# Patient Record
Sex: MALE | Race: BLACK OR AFRICAN AMERICAN | Employment: FULL TIME | URBAN - METROPOLITAN AREA
[De-identification: names, ages, dates, MRNs, and addresses within clinical notes are randomized per-mention and may not be internally consistent; named-entity substitution may affect disease eponyms.]

---

## 2017-12-01 ENCOUNTER — TELEPHONE (OUTPATIENT)
Dept: INTERNAL MEDICINE CLINIC | Age: 32
End: 2017-12-01

## 2017-12-05 ENCOUNTER — OFFICE VISIT (OUTPATIENT)
Dept: INTERNAL MEDICINE CLINIC | Age: 32
End: 2017-12-05

## 2017-12-05 VITALS
OXYGEN SATURATION: 99 % | BODY MASS INDEX: 45.1 KG/M2 | HEIGHT: 70 IN | WEIGHT: 315 LBS | SYSTOLIC BLOOD PRESSURE: 138 MMHG | DIASTOLIC BLOOD PRESSURE: 86 MMHG | HEART RATE: 90 BPM | TEMPERATURE: 98.4 F

## 2017-12-05 DIAGNOSIS — Z11.4 ENCOUNTER FOR SCREENING FOR HIV: ICD-10-CM

## 2017-12-05 DIAGNOSIS — Z00.00 ROUTINE GENERAL MEDICAL EXAMINATION AT A HEALTH CARE FACILITY: Primary | ICD-10-CM

## 2017-12-05 LAB
ANION GAP SERPL CALCULATED.3IONS-SCNC: 19 MMOL/L (ref 3–16)
BASOPHILS ABSOLUTE: 0.1 K/UL (ref 0–0.2)
BASOPHILS RELATIVE PERCENT: 1 %
BUN BLDV-MCNC: 11 MG/DL (ref 7–20)
CALCIUM SERPL-MCNC: 9.5 MG/DL (ref 8.3–10.6)
CHLORIDE BLD-SCNC: 99 MMOL/L (ref 99–110)
CHOLESTEROL, TOTAL: 225 MG/DL (ref 0–199)
CO2: 28 MMOL/L (ref 21–32)
CREAT SERPL-MCNC: 0.9 MG/DL (ref 0.9–1.3)
EOSINOPHILS ABSOLUTE: 0.2 K/UL (ref 0–0.6)
EOSINOPHILS RELATIVE PERCENT: 3.2 %
GFR AFRICAN AMERICAN: >60
GFR NON-AFRICAN AMERICAN: >60
GLUCOSE BLD-MCNC: 85 MG/DL (ref 70–99)
HCT VFR BLD CALC: 45.1 % (ref 40.5–52.5)
HDLC SERPL-MCNC: 45 MG/DL (ref 40–60)
HEMOGLOBIN: 14.9 G/DL (ref 13.5–17.5)
LDL CHOLESTEROL CALCULATED: 147 MG/DL
LYMPHOCYTES ABSOLUTE: 1.9 K/UL (ref 1–5.1)
LYMPHOCYTES RELATIVE PERCENT: 25.3 %
MCH RBC QN AUTO: 28.1 PG (ref 26–34)
MCHC RBC AUTO-ENTMCNC: 33 G/DL (ref 31–36)
MCV RBC AUTO: 85.2 FL (ref 80–100)
MONOCYTES ABSOLUTE: 0.7 K/UL (ref 0–1.3)
MONOCYTES RELATIVE PERCENT: 9.5 %
NEUTROPHILS ABSOLUTE: 4.7 K/UL (ref 1.7–7.7)
NEUTROPHILS RELATIVE PERCENT: 61 %
PDW BLD-RTO: 14.3 % (ref 12.4–15.4)
PLATELET # BLD: 263 K/UL (ref 135–450)
PMV BLD AUTO: 9.9 FL (ref 5–10.5)
POTASSIUM SERPL-SCNC: 4 MMOL/L (ref 3.5–5.1)
RBC # BLD: 5.29 M/UL (ref 4.2–5.9)
SODIUM BLD-SCNC: 146 MMOL/L (ref 136–145)
TRIGL SERPL-MCNC: 163 MG/DL (ref 0–150)
TSH REFLEX: 2.75 UIU/ML (ref 0.27–4.2)
VLDLC SERPL CALC-MCNC: 33 MG/DL
WBC # BLD: 7.7 K/UL (ref 4–11)

## 2017-12-05 PROCEDURE — 99395 PREV VISIT EST AGE 18-39: CPT | Performed by: NURSE PRACTITIONER

## 2017-12-05 ASSESSMENT — ENCOUNTER SYMPTOMS: COUGH: 1

## 2017-12-06 LAB — HIV-1 AND HIV-2 ANTIBODIES: NORMAL

## 2019-01-04 ENCOUNTER — HOSPITAL ENCOUNTER (EMERGENCY)
Age: 34
Discharge: HOME | End: 2019-01-04
Attending: EMERGENCY MEDICINE
Payer: COMMERCIAL

## 2019-01-04 ENCOUNTER — APPOINTMENT (OUTPATIENT)
Dept: CT IMAGING | Age: 34
End: 2019-01-04
Payer: COMMERCIAL

## 2019-01-04 VITALS
TEMPERATURE: 101.7 F | WEIGHT: 315 LBS | HEART RATE: 114 BPM | BODY MASS INDEX: 57.83 KG/M2 | OXYGEN SATURATION: 92 % | RESPIRATION RATE: 27 BRPM | DIASTOLIC BLOOD PRESSURE: 113 MMHG | SYSTOLIC BLOOD PRESSURE: 177 MMHG

## 2019-01-04 DIAGNOSIS — R11.2 NAUSEA VOMITING AND DIARRHEA: Primary | ICD-10-CM

## 2019-01-04 DIAGNOSIS — R19.7 NAUSEA VOMITING AND DIARRHEA: Primary | ICD-10-CM

## 2019-01-04 LAB
A/G RATIO: 1.1 (ref 1.1–2.2)
ALBUMIN SERPL-MCNC: 4.2 G/DL (ref 3.4–5)
ALP BLD-CCNC: 78 U/L (ref 40–129)
ALT SERPL-CCNC: 22 U/L (ref 10–40)
ANION GAP SERPL CALCULATED.3IONS-SCNC: 13 MMOL/L (ref 3–16)
AST SERPL-CCNC: 19 U/L (ref 15–37)
BASOPHILS ABSOLUTE: 0 K/UL (ref 0–0.2)
BASOPHILS RELATIVE PERCENT: 0.3 %
BILIRUB SERPL-MCNC: 0.5 MG/DL (ref 0–1)
BUN BLDV-MCNC: 11 MG/DL (ref 7–20)
CALCIUM SERPL-MCNC: 9.3 MG/DL (ref 8.3–10.6)
CHLORIDE BLD-SCNC: 100 MMOL/L (ref 99–110)
CO2: 28 MMOL/L (ref 21–32)
CREAT SERPL-MCNC: 0.9 MG/DL (ref 0.9–1.3)
EOSINOPHILS ABSOLUTE: 0.1 K/UL (ref 0–0.6)
EOSINOPHILS RELATIVE PERCENT: 1.1 %
GFR AFRICAN AMERICAN: >60
GFR NON-AFRICAN AMERICAN: >60
GLOBULIN: 3.7 G/DL
GLUCOSE BLD-MCNC: 110 MG/DL (ref 70–99)
HCT VFR BLD CALC: 45.6 % (ref 40.5–52.5)
HEMOGLOBIN: 14.9 G/DL (ref 13.5–17.5)
LYMPHOCYTES ABSOLUTE: 0.4 K/UL (ref 1–5.1)
LYMPHOCYTES RELATIVE PERCENT: 4.2 %
MCH RBC QN AUTO: 27.1 PG (ref 26–34)
MCHC RBC AUTO-ENTMCNC: 32.8 G/DL (ref 31–36)
MCV RBC AUTO: 82.7 FL (ref 80–100)
MONOCYTES ABSOLUTE: 0.4 K/UL (ref 0–1.3)
MONOCYTES RELATIVE PERCENT: 3.5 %
NEUTROPHILS ABSOLUTE: 9.6 K/UL (ref 1.7–7.7)
NEUTROPHILS RELATIVE PERCENT: 90.9 %
PDW BLD-RTO: 14.7 % (ref 12.4–15.4)
PLATELET # BLD: 296 K/UL (ref 135–450)
PMV BLD AUTO: 9 FL (ref 5–10.5)
POTASSIUM SERPL-SCNC: 3.8 MMOL/L (ref 3.5–5.1)
RBC # BLD: 5.51 M/UL (ref 4.2–5.9)
SODIUM BLD-SCNC: 141 MMOL/L (ref 136–145)
TOTAL PROTEIN: 7.9 G/DL (ref 6.4–8.2)
WBC # BLD: 10.6 K/UL (ref 4–11)

## 2019-01-04 PROCEDURE — 85025 COMPLETE CBC W/AUTO DIFF WBC: CPT

## 2019-01-04 PROCEDURE — 99284 EMERGENCY DEPT VISIT MOD MDM: CPT

## 2019-01-04 PROCEDURE — 2580000003 HC RX 258: Performed by: EMERGENCY MEDICINE

## 2019-01-04 PROCEDURE — 80053 COMPREHEN METABOLIC PANEL: CPT

## 2019-01-04 PROCEDURE — 6360000004 HC RX CONTRAST MEDICATION: Performed by: EMERGENCY MEDICINE

## 2019-01-04 PROCEDURE — 6360000002 HC RX W HCPCS: Performed by: EMERGENCY MEDICINE

## 2019-01-04 PROCEDURE — 74177 CT ABD & PELVIS W/CONTRAST: CPT

## 2019-01-04 PROCEDURE — 6370000000 HC RX 637 (ALT 250 FOR IP): Performed by: EMERGENCY MEDICINE

## 2019-01-04 PROCEDURE — 96374 THER/PROPH/DIAG INJ IV PUSH: CPT

## 2019-01-04 PROCEDURE — 96361 HYDRATE IV INFUSION ADD-ON: CPT

## 2019-01-04 RX ORDER — ONDANSETRON 8 MG/1
8 TABLET, ORALLY DISINTEGRATING ORAL EVERY 8 HOURS PRN
Qty: 30 TABLET | Refills: 2 | Status: SHIPPED | OUTPATIENT
Start: 2019-01-04 | End: 2019-01-04

## 2019-01-04 RX ORDER — ONDANSETRON 2 MG/ML
4 INJECTION INTRAMUSCULAR; INTRAVENOUS
Status: COMPLETED | OUTPATIENT
Start: 2019-01-04 | End: 2019-01-04

## 2019-01-04 RX ORDER — DICYCLOMINE HYDROCHLORIDE 10 MG/ML
20 INJECTION INTRAMUSCULAR ONCE
Status: DISCONTINUED | OUTPATIENT
Start: 2019-01-04 | End: 2019-01-04

## 2019-01-04 RX ORDER — ONDANSETRON 8 MG/1
8 TABLET, ORALLY DISINTEGRATING ORAL EVERY 8 HOURS PRN
Qty: 30 TABLET | Refills: 2 | Status: SHIPPED | OUTPATIENT
Start: 2019-01-04 | End: 2019-05-04

## 2019-01-04 RX ORDER — DICYCLOMINE HCL 20 MG
20 TABLET ORAL ONCE
Status: COMPLETED | OUTPATIENT
Start: 2019-01-04 | End: 2019-01-04

## 2019-01-04 RX ORDER — 0.9 % SODIUM CHLORIDE 0.9 %
1000 INTRAVENOUS SOLUTION INTRAVENOUS ONCE
Status: COMPLETED | OUTPATIENT
Start: 2019-01-04 | End: 2019-01-04

## 2019-01-04 RX ORDER — LABETALOL 100 MG/1
50 TABLET, FILM COATED ORAL ONCE
Status: COMPLETED | OUTPATIENT
Start: 2019-01-04 | End: 2019-01-04

## 2019-01-04 RX ORDER — TRIAMTERENE AND HYDROCHLOROTHIAZIDE 37.5; 25 MG/1; MG/1
1 TABLET ORAL ONCE
Status: COMPLETED | OUTPATIENT
Start: 2019-01-04 | End: 2019-01-04

## 2019-01-04 RX ORDER — LOPERAMIDE HYDROCHLORIDE 2 MG/1
2 CAPSULE ORAL ONCE
Status: COMPLETED | OUTPATIENT
Start: 2019-01-04 | End: 2019-01-04

## 2019-01-04 RX ORDER — SODIUM CHLORIDE, SODIUM LACTATE, POTASSIUM CHLORIDE, AND CALCIUM CHLORIDE .6; .31; .03; .02 G/100ML; G/100ML; G/100ML; G/100ML
1000 INJECTION, SOLUTION INTRAVENOUS ONCE
Status: COMPLETED | OUTPATIENT
Start: 2019-01-04 | End: 2019-01-04

## 2019-01-04 RX ADMIN — DICYCLOMINE HYDROCHLORIDE 20 MG: 20 TABLET ORAL at 17:58

## 2019-01-04 RX ADMIN — ONDANSETRON 4 MG: 2 INJECTION INTRAMUSCULAR; INTRAVENOUS at 16:59

## 2019-01-04 RX ADMIN — TRIAMTERENE AND HYDROCHLOROTHIAZIDE 1 TABLET: 37.5; 25 TABLET ORAL at 19:15

## 2019-01-04 RX ADMIN — SODIUM CHLORIDE, POTASSIUM CHLORIDE, SODIUM LACTATE AND CALCIUM CHLORIDE 1000 ML: 600; 310; 30; 20 INJECTION, SOLUTION INTRAVENOUS at 20:52

## 2019-01-04 RX ADMIN — LABETALOL HYDROCHLORIDE 50 MG: 100 TABLET, FILM COATED ORAL at 19:15

## 2019-01-04 RX ADMIN — LOPERAMIDE HYDROCHLORIDE 2 MG: 2 CAPSULE ORAL at 16:59

## 2019-01-04 RX ADMIN — IOPAMIDOL 80 ML: 755 INJECTION, SOLUTION INTRAVENOUS at 20:18

## 2019-01-04 RX ADMIN — SODIUM CHLORIDE 1000 ML: 9 INJECTION, SOLUTION INTRAVENOUS at 16:59

## 2019-01-04 ASSESSMENT — ENCOUNTER SYMPTOMS
SHORTNESS OF BREATH: 0
DIARRHEA: 1
NAUSEA: 1
VOMITING: 1
ABDOMINAL PAIN: 1
BACK PAIN: 0
COUGH: 0
RHINORRHEA: 0

## 2019-01-04 ASSESSMENT — PAIN DESCRIPTION - PAIN TYPE: TYPE: ACUTE PAIN

## 2019-01-04 ASSESSMENT — PAIN SCALES - GENERAL: PAINLEVEL_OUTOF10: 7

## 2019-01-23 ENCOUNTER — OFFICE VISIT (OUTPATIENT)
Dept: PRIMARY CARE CLINIC | Age: 34
End: 2019-01-23
Payer: COMMERCIAL

## 2019-01-23 VITALS
DIASTOLIC BLOOD PRESSURE: 88 MMHG | SYSTOLIC BLOOD PRESSURE: 138 MMHG | BODY MASS INDEX: 56.42 KG/M2 | TEMPERATURE: 98.5 F | HEART RATE: 68 BPM | WEIGHT: 315 LBS

## 2019-01-23 DIAGNOSIS — R73.09 ELEVATED GLUCOSE: ICD-10-CM

## 2019-01-23 DIAGNOSIS — R19.7 DIARRHEA, UNSPECIFIED TYPE: Primary | ICD-10-CM

## 2019-01-23 PROCEDURE — 99214 OFFICE O/P EST MOD 30 MIN: CPT | Performed by: INTERNAL MEDICINE

## 2019-01-24 LAB
ESTIMATED AVERAGE GLUCOSE: 131.2 MG/DL
HBA1C MFR BLD: 6.2 %

## 2019-05-04 ENCOUNTER — HOSPITAL ENCOUNTER (EMERGENCY)
Age: 34
Discharge: HOME | End: 2019-05-04
Attending: EMERGENCY MEDICINE
Payer: COMMERCIAL

## 2019-05-04 VITALS
OXYGEN SATURATION: 98 % | HEART RATE: 96 BPM | TEMPERATURE: 98.8 F | DIASTOLIC BLOOD PRESSURE: 126 MMHG | RESPIRATION RATE: 17 BRPM | SYSTOLIC BLOOD PRESSURE: 205 MMHG

## 2019-05-04 DIAGNOSIS — S61.213A LACERATION OF LEFT MIDDLE FINGER WITHOUT FOREIGN BODY WITHOUT DAMAGE TO NAIL, INITIAL ENCOUNTER: Primary | ICD-10-CM

## 2019-05-04 DIAGNOSIS — R03.0 ELEVATED BLOOD PRESSURE READING: ICD-10-CM

## 2019-05-04 PROCEDURE — 4500000022 HC ED LEVEL 2 PROCEDURE

## 2019-05-04 PROCEDURE — 2500000003 HC RX 250 WO HCPCS: Performed by: EMERGENCY MEDICINE

## 2019-05-04 PROCEDURE — 90715 TDAP VACCINE 7 YRS/> IM: CPT | Performed by: EMERGENCY MEDICINE

## 2019-05-04 PROCEDURE — 90471 IMMUNIZATION ADMIN: CPT | Performed by: EMERGENCY MEDICINE

## 2019-05-04 PROCEDURE — 6370000000 HC RX 637 (ALT 250 FOR IP): Performed by: EMERGENCY MEDICINE

## 2019-05-04 PROCEDURE — 6360000002 HC RX W HCPCS: Performed by: EMERGENCY MEDICINE

## 2019-05-04 PROCEDURE — 99282 EMERGENCY DEPT VISIT SF MDM: CPT

## 2019-05-04 RX ORDER — BACITRACIN ZINC AND POLYMYXIN B SULFATE 500; 1000 [USP'U]/G; [USP'U]/G
OINTMENT TOPICAL ONCE
Status: COMPLETED | OUTPATIENT
Start: 2019-05-04 | End: 2019-05-04

## 2019-05-04 RX ORDER — AMLODIPINE BESYLATE 5 MG/1
5 TABLET ORAL DAILY
Status: DISCONTINUED | OUTPATIENT
Start: 2019-05-04 | End: 2019-05-04 | Stop reason: HOSPADM

## 2019-05-04 RX ORDER — LIDOCAINE HYDROCHLORIDE 10 MG/ML
5 INJECTION, SOLUTION EPIDURAL; INFILTRATION; INTRACAUDAL; PERINEURAL ONCE
Status: COMPLETED | OUTPATIENT
Start: 2019-05-04 | End: 2019-05-04

## 2019-05-04 RX ORDER — AMLODIPINE BESYLATE 5 MG/1
5 TABLET ORAL DAILY
Qty: 30 TABLET | Refills: 2 | Status: SHIPPED | OUTPATIENT
Start: 2019-05-04 | End: 2021-10-17 | Stop reason: SDUPTHER

## 2019-05-04 RX ADMIN — TETANUS TOXOID, REDUCED DIPHTHERIA TOXOID AND ACELLULAR PERTUSSIS VACCINE, ADSORBED 0.5 ML: 5; 2.5; 8; 8; 2.5 SUSPENSION INTRAMUSCULAR at 18:45

## 2019-05-04 RX ADMIN — AMLODIPINE BESYLATE 5 MG: 5 TABLET ORAL at 18:56

## 2019-05-04 RX ADMIN — BACITRACIN ZINC AND POLYMYXIN B SULFATE: 500; 10000 OINTMENT TOPICAL at 19:38

## 2019-05-04 RX ADMIN — LIDOCAINE HYDROCHLORIDE 5 ML: 10 INJECTION, SOLUTION EPIDURAL; INFILTRATION; INTRACAUDAL; PERINEURAL at 18:26

## 2019-05-04 NOTE — ED PROVIDER NOTES
It probably a runningTRIAGE CHIEF COMPLAINT:   Chief Complaint   Patient presents with    Laceration         HPI: Osman Cruz is a 35 y.o. male who presents to the Emergency Department with complaint of laceration of his left third fingertip at work while he was cutting food with a knife. 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 He is right-handed. Tetanus status is uncertain. Complains of local pain. The patient has a history of hypertension and states for some time now he has been only on Maxide 37.5-25 daily. He was previously on labetalol. REVIEW OF SYSTEMS:  6 systems reviewed. Pertinent positives per HPI. Otherwise noted to be negative. Nursing notes reviewed and agree with above. Past medical/surgical history reviewed. MEDICATIONS   Patient's Medications   New Prescriptions    No medications on file   Previous Medications    TRIAMTERENE-HYDROCHLOROTHIAZIDE (MAXZIDE-25) 37.5-25 MG PER TABLET    Take 1 tablet by mouth daily   Modified Medications    No medications on file   Discontinued Medications    LABETALOL (NORMODYNE) 100 MG TABLET    Take 0.5 tablets by mouth every 12 hours    ONDANSETRON (ZOFRAN-ODT) 8 MG TBDP DISINTEGRATING TABLET    Take 1 tablet by mouth every 8 hours as needed for Nausea or Vomiting         ALLERGIES No Known Allergies      BP (!) 199/108   Pulse 104   Temp 98.8 °F (37.1 °C)   Resp 17   SpO2 98%   General:  No acute distress. Non toxic appearance  Head:   Normocephalic and atraumatic  Eyes:   Conjunctiva clear, MARCO, EOM's intact. Sclera anicteric. ENT:   Mucous membranes moist  Neck:   Supple. No adenopathy or jugular venous distension  Lungs/Chest:  No respiratory distress  CVS:   Regular rate and rhythm  Abdomen:  ddeferred  Extremities:  Full range of motion. There is a  Transverse 1.5 cm laceration on the left third fingertip ddistally. No nail or nailbed involvement. This is oozing. No evidence of infection.   Skin:   No rashes or lesions to exposed skin  Back:   deferred  Neuro:  Alert and OX3. Speech clear and appropriate. No focal weakness. Normal sensation in all extremities. Gait normal.  Psych:   Affect normal. Mood normal        RADIOLOGY:      LAB    PROCEDURES:  Options were discussed and the patient requested sutures. The area was anesthetized with 1% plain lidocaine using 2cc. It was cleaned with Hibiclens and saline. Exploration was negative. It was repaired using 5-0 nylon sutures ×5. Cover with Polysporin and a Band-Aid. ED COURSE / MDM:  43-year-old male with superficial laceration of left third fingertip had repair done with simple nylon sutures after local anesthesia with 1% plain lidocaine. He was given tetanus immunization. The patient's blood pressure was markedly elevated here however the blood pressure cuff is too small for his arm. I discussed adding something to his regimen and he was agreeable. He was given Norvasc by mouth here. His elevated blood pressure is asymptomatic. No chest pain, shortness of breath, headache, neck pain, dizziness, vertigo, numbness or weakness. I recommended follow-up with his primary care doctor. He was given a prescription for Norvasc and advised to take along with the diuretic. I recommended follow up with the worker's comp physician regarding his finger laceration. I discussed with Elzbieta Lei the results of evaluation in the Emergency Department, diagnosis, care and prognosis. The plan is to discharge to home. The patient is in agreement with the plan and questions have been answered. I also discussed with the patient and/or family the reasons which may require a return visit and the importance of follow-up care.        (Please note that portions of this note may have been completed with a voice recognition program.  Efforts were made to edit the dictation but occasionally words are mis-transcribed)        FINAL IMPRESSION:  1 -- laceration left middle finger, 1.5 cm, simple repair  2 -- elevated blood pressure reading                    Prince Philip MD  05/04/19 7803

## 2021-10-17 ENCOUNTER — HOSPITAL ENCOUNTER (EMERGENCY)
Age: 36
Discharge: HOME | End: 2021-10-17
Attending: EMERGENCY MEDICINE
Payer: COMMERCIAL

## 2021-10-17 ENCOUNTER — APPOINTMENT (OUTPATIENT)
Dept: GENERAL RADIOLOGY | Age: 36
End: 2021-10-17
Payer: COMMERCIAL

## 2021-10-17 VITALS
BODY MASS INDEX: 46.65 KG/M2 | SYSTOLIC BLOOD PRESSURE: 178 MMHG | WEIGHT: 315 LBS | DIASTOLIC BLOOD PRESSURE: 113 MMHG | HEART RATE: 115 BPM | HEIGHT: 69 IN | OXYGEN SATURATION: 96 % | TEMPERATURE: 98.2 F | RESPIRATION RATE: 31 BRPM

## 2021-10-17 DIAGNOSIS — I10 UNCONTROLLED HYPERTENSION: Primary | ICD-10-CM

## 2021-10-17 DIAGNOSIS — R00.0 TACHYCARDIA: ICD-10-CM

## 2021-10-17 LAB
A/G RATIO: 0.8 (ref 1.1–2.2)
ALBUMIN SERPL-MCNC: 3.6 G/DL (ref 3.4–5)
ALP BLD-CCNC: 57 U/L (ref 40–129)
ALT SERPL-CCNC: 23 U/L (ref 10–40)
ANION GAP SERPL CALCULATED.3IONS-SCNC: 10 MMOL/L (ref 3–16)
AST SERPL-CCNC: 20 U/L (ref 15–37)
BASOPHILS ABSOLUTE: 0.1 K/UL (ref 0–0.2)
BASOPHILS RELATIVE PERCENT: 0.9 %
BILIRUB SERPL-MCNC: 0.3 MG/DL (ref 0–1)
BUN BLDV-MCNC: 16 MG/DL (ref 7–20)
CALCIUM SERPL-MCNC: 9.3 MG/DL (ref 8.3–10.6)
CHLORIDE BLD-SCNC: 103 MMOL/L (ref 99–110)
CO2: 28 MMOL/L (ref 21–32)
CREAT SERPL-MCNC: 1.3 MG/DL (ref 0.9–1.3)
EOSINOPHILS ABSOLUTE: 0.1 K/UL (ref 0–0.6)
EOSINOPHILS RELATIVE PERCENT: 1.4 %
GFR AFRICAN AMERICAN: >60
GFR NON-AFRICAN AMERICAN: >60
GLOBULIN: 4.4 G/DL
GLUCOSE BLD-MCNC: 96 MG/DL (ref 70–99)
HCT VFR BLD CALC: 47.3 % (ref 40.5–52.5)
HEMOGLOBIN: 15.7 G/DL (ref 13.5–17.5)
LYMPHOCYTES ABSOLUTE: 2 K/UL (ref 1–5.1)
LYMPHOCYTES RELATIVE PERCENT: 23 %
MAGNESIUM: 1.9 MG/DL (ref 1.8–2.4)
MCH RBC QN AUTO: 26.7 PG (ref 26–34)
MCHC RBC AUTO-ENTMCNC: 33.1 G/DL (ref 31–36)
MCV RBC AUTO: 80.6 FL (ref 80–100)
MONOCYTES ABSOLUTE: 0.7 K/UL (ref 0–1.3)
MONOCYTES RELATIVE PERCENT: 8 %
NEUTROPHILS ABSOLUTE: 5.9 K/UL (ref 1.7–7.7)
NEUTROPHILS RELATIVE PERCENT: 66.7 %
PDW BLD-RTO: 15.1 % (ref 12.4–15.4)
PLATELET # BLD: 338 K/UL (ref 135–450)
PLATELET SLIDE REVIEW: ADEQUATE
PMV BLD AUTO: 9.5 FL (ref 5–10.5)
POTASSIUM REFLEX MAGNESIUM: 3.4 MMOL/L (ref 3.5–5.1)
PRO-BNP: 798 PG/ML (ref 0–124)
RBC # BLD: 5.86 M/UL (ref 4.2–5.9)
SLIDE REVIEW: NORMAL
SODIUM BLD-SCNC: 141 MMOL/L (ref 136–145)
TOTAL PROTEIN: 8 G/DL (ref 6.4–8.2)
TROPONIN: 0.01 NG/ML
WBC # BLD: 8.8 K/UL (ref 4–11)

## 2021-10-17 PROCEDURE — 84484 ASSAY OF TROPONIN QUANT: CPT

## 2021-10-17 PROCEDURE — 83735 ASSAY OF MAGNESIUM: CPT

## 2021-10-17 PROCEDURE — 2580000003 HC RX 258: Performed by: EMERGENCY MEDICINE

## 2021-10-17 PROCEDURE — 99283 EMERGENCY DEPT VISIT LOW MDM: CPT

## 2021-10-17 PROCEDURE — 83880 ASSAY OF NATRIURETIC PEPTIDE: CPT

## 2021-10-17 PROCEDURE — 85025 COMPLETE CBC W/AUTO DIFF WBC: CPT

## 2021-10-17 PROCEDURE — 80053 COMPREHEN METABOLIC PANEL: CPT

## 2021-10-17 PROCEDURE — 96360 HYDRATION IV INFUSION INIT: CPT

## 2021-10-17 PROCEDURE — 71046 X-RAY EXAM CHEST 2 VIEWS: CPT

## 2021-10-17 PROCEDURE — 93005 ELECTROCARDIOGRAM TRACING: CPT | Performed by: EMERGENCY MEDICINE

## 2021-10-17 RX ORDER — SODIUM CHLORIDE, SODIUM LACTATE, POTASSIUM CHLORIDE, AND CALCIUM CHLORIDE .6; .31; .03; .02 G/100ML; G/100ML; G/100ML; G/100ML
1000 INJECTION, SOLUTION INTRAVENOUS ONCE
Status: COMPLETED | OUTPATIENT
Start: 2021-10-17 | End: 2021-10-17

## 2021-10-17 RX ORDER — TRIAMTERENE AND HYDROCHLOROTHIAZIDE 37.5; 25 MG/1; MG/1
1 TABLET ORAL DAILY
Qty: 30 TABLET | Refills: 0 | Status: SHIPPED | OUTPATIENT
Start: 2021-10-17 | End: 2022-06-03 | Stop reason: ALTCHOICE

## 2021-10-17 RX ORDER — AMLODIPINE BESYLATE 5 MG/1
5 TABLET ORAL DAILY
Qty: 30 TABLET | Refills: 0 | Status: SHIPPED | OUTPATIENT
Start: 2021-10-17 | End: 2022-06-03 | Stop reason: SDUPTHER

## 2021-10-17 RX ADMIN — SODIUM CHLORIDE, POTASSIUM CHLORIDE, SODIUM LACTATE AND CALCIUM CHLORIDE 1000 ML: 600; 310; 30; 20 INJECTION, SOLUTION INTRAVENOUS at 19:41

## 2021-10-17 ASSESSMENT — ENCOUNTER SYMPTOMS
EYE PAIN: 0
BACK PAIN: 0
DIARRHEA: 0
ABDOMINAL PAIN: 0
VOMITING: 0
SHORTNESS OF BREATH: 0

## 2021-10-17 NOTE — ED TRIAGE NOTES
Patient to ed from Urgent care for high blood pressure, patient reports he has stopped his medications.

## 2021-10-17 NOTE — ED PROVIDER NOTES
[address] [address] ENCOUNTER      Pt Name: Karine Dyer  MRN: [id number]  [place] 1985  Date of evaluation: 10/17/2021  Provider: Heyward Nyhan, MD    CHIEF COMPLAINT       Chief Complaint   Patient presents with    Hypertension     170/120 measured at urgent care         HISTORY OF PRESENT ILLNESS   (Location/Symptom, Timing/Onset,Context/Setting, Quality, Duration, Modifying Factors, Severity)  Note limiting factors. Karine Dyer is a 39 y.o. male who presents to the emergency department for elevated blood pressure. he went to urgent care for a cough and they checked his blood pressure, noticed it was elevated and sent him in. Had been on antihypertensives a while back he stopped them because he needs a new doctor. Cough has been going on for 2 weeks. Ashley Fleming. No exertional dyspnea or fatigue. Initally also had rhinorrhea and sore thraoat but that improved. Nursing notes were reviewed. REVIEW OF SYSTEMS    (2-9 systems for level 4, 10 or more for level 5)     Review of Systems   Constitutional: Negative for fever. Eyes: Negative for pain. Respiratory: Negative for shortness of breath. Cardiovascular: Negative for chest pain and leg swelling. Gastrointestinal: Negative for abdominal pain, diarrhea and vomiting. Genitourinary: Negative for dysuria. Musculoskeletal: Negative for back pain. Skin: Negative for rash. Neurological: Negative for headaches. Hematological: Does not bruise/bleed easily.          PAST MEDICAL HISTORY     Past Medical History:   Diagnosis Date    Diverticulitis     3 bout since 2009    Essential hypertension          SURGICALHISTORY       Past Surgical History:   Procedure Laterality Date    ABDOMEN SURGERY N/A 08/08/2016    ROBOTIC ASSISTED LAPAROSCOPIC SIGMOID COLECTOMY ,COLORECTAL    COLON SURGERY  2016    COLONOSCOPY      HIP SURGERY      bilat repair for hip dysplasia         CURRENT MEDICATIONS Discharge Medication List as of 10/17/2021  8:44 PM          ALLERGIES     Patient has no known allergies. FAMILY HISTORY     History reviewed. No pertinent family history. SOCIAL HISTORY       Social History     Socioeconomic History    Marital status: Single     Spouse name: None    Number of children: None    Years of education: None    Highest education level: None   Occupational History    None   Tobacco Use    Smoking status: Current Some Day Smoker     Types: Cigars    Smokeless tobacco: Never Used    Tobacco comment: once a week   Substance and Sexual Activity    Alcohol use: Yes     Comment: socially    Drug use: No    Sexual activity: None   Other Topics Concern    None   Social History Narrative    None     Social Determinants of Health     Financial Resource Strain:     Difficulty of Paying Living Expenses:    Food Insecurity:     Worried About Running Out of Food in the Last Year:     Ran Out of Food in the Last Year:    Transportation Needs:     Lack of Transportation (Medical):      Lack of Transportation (Non-Medical):    Physical Activity:     Days of Exercise per Week:     Minutes of Exercise per Session:    Stress:     Feeling of Stress :    Social Connections:     Frequency of Communication with Friends and Family:     Frequency of Social Gatherings with Friends and Family:     Attends Oriental orthodox Services:     Active Member of Clubs or Organizations:     Attends Club or Organization Meetings:     Marital Status:    Intimate Partner Violence:     Fear of Current or Ex-Partner:     Emotionally Abused:     Physically Abused:     Sexually Abused:        SCREENINGS             PHYSICAL EXAM    (up to 7 for level 4, 8 or more for level 5)     ED Triage Vitals [10/17/21 1840]   BP Temp Temp Source Pulse Resp SpO2 Height Weight   (!) 213/146 98.2 °F (36.8 °C) Oral 120 16 95 % 5' 9\" (1.753 m) (!) 415 lb 4.8 oz (188.4 kg)       Physical Exam  Vitals and nursing note reviewed. Constitutional:       Appearance: Normal appearance. He is well-developed. He is not ill-appearing. HENT:      Head: Normocephalic and atraumatic. Right Ear: External ear normal.      Left Ear: External ear normal.      Nose: Nose normal.   Eyes:      General: No scleral icterus. Right eye: No discharge. Left eye: No discharge. Conjunctiva/sclera: Conjunctivae normal.   Cardiovascular:      Rate and Rhythm: Regular rhythm. Tachycardia present. Pulses: Normal pulses. Heart sounds: Normal heart sounds. Pulmonary:      Effort: Pulmonary effort is normal. No respiratory distress. Breath sounds: Normal breath sounds. No wheezing or rales. Abdominal:      General: Bowel sounds are normal. There is no distension. Palpations: Abdomen is soft. Tenderness: There is no abdominal tenderness. There is no guarding or rebound. Musculoskeletal:         General: No swelling, tenderness, deformity or signs of injury. Cervical back: Neck supple. Skin:     Coloration: Skin is not pale. Neurological:      Mental Status: He is alert. Psychiatric:         Mood and Affect: Mood normal.         Behavior: Behavior normal.             DIAGNOSTIC RESULTS     EKG: All EKG's are interpreted by the Emergency Department Physician who either signs or Co-signs this chart in the absence of a cardiologist.    12 lead EKG shows sinus tachycardia at a rate of 117 bpm, NE interval QRS QTC normal.  No acute ischemic findings. Left axis deviation. Possible left atrial enlargement. RADIOLOGY:   Non-plain film images such as CT, Ultrasound and MRI are read by the radiologist. Plain radiographic images are visualized and preliminarily interpreted by the emergency physician with the below findings:        Interpretation per the Radiologist below, if available at the time of this note:    XR CHEST (2 VW)   Final Result      1.  Mild patchy bilateral basilar airspace disease. 2. Cardiomegaly. ED BEDSIDE ULTRASOUND:   Performed by ED Physician - none    LABS:  Labs Reviewed   COMPREHENSIVE METABOLIC PANEL W/ REFLEX TO MG FOR LOW K - Abnormal; Notable for the following components:       Result Value    Potassium reflex Magnesium 3.4 (*)     Albumin/Globulin Ratio 0.8 (*)     All other components within normal limits    Narrative:     Performed at:  Critical access hospital  [facility]  [place] [facility] [place]   Phone [phone]   BRAIN NATRIURETIC PEPTIDE - Abnormal; Notable for the following components:    Pro- (*)     All other components within normal limits    Narrative:     Performed at:  Critical access hospital  [facility],  [place]   Phone [phone]   CBC WITH AUTO DIFFERENTIAL    Narrative:     Performed at:  Critical access hospital  [facility],  [place]   Phone [phone]   TROPONIN    Narrative:     Performed at:  Critical access hospital  [facility],  [place], [address]   Phone [phone]   MAGNESIUM    Narrative:     Performed at:  Critical access hospital  [facility]  [place] [address] [employer] [facility]   Phone [phone]       All other labs were within normal range or not returned as of this dictation. EMERGENCY DEPARTMENT COURSE and DIFFERENTIAL DIAGNOSIS/MDM:   Vitals:    Vitals:    10/17/21 1915 10/17/21 1930 10/17/21 1945 10/17/21 2000   BP:  (!) 198/107  (!) 178/113   Pulse: 118 113 108 115   Resp: 27 (!) 31     Temp:       TempSrc:       SpO2: 98% 94% 96% 96%   Weight:       Height:           Adult male who comes in for hyper tension. Patient is asymptomatic. He does complain of cough for several weeks. Laboratory studies chest x-ray and EKG ordered. He is placed on cardiac blood pressure and pulse oximetry monitoring.     Cardiac monitor is read and interpreted myself shows sinus tachycardia. Because of his tachycardia he is given a liter bolus however is only received about 500 cc bolus prior to discharge. His blood pressure is trending down with no improvement of his blood pressure after the IV fluids. Patient is otherwise asymptomatic for infection. Low risk for pulmonary embolism and dissection is less likely he has pulses in all extremities. His chest x-ray shows cardiomegaly with no signs of pulmonary edema. He has no crackles or rales on exam.  The patient will be discharged with antihypertensives. He is counseled on the importance of regularly taking his medications as prescribed and diet and lifestyle modification. He is given a PCP referral.               CRITICAL CARE TIME   None       CONSULTS:  None    PROCEDURES:       Procedures    FINAL IMPRESSION      1. Uncontrolled hypertension    2.  Tachycardia          DISPOSITION/PLAN   DISPOSITION Decision To Discharge 10/17/2021 08:33:35 PM      PATIENT REFERREDTO:  Lisbet Mcfarlane  [address]  [phone]    Schedule an appointment as soon as possible for a visit         DISCHARGEMEDICATIONS:  Discharge Medication List as of 10/17/2021  8:44 PM             (Please note that portions of this note were completed with a voice recognition program.  Efforts were made to edit the dictations but occasionally words are mis-transcribed.)    Duwaine Apley, MD (electronically signed)  Attending Emergency Physician        Duwaine Apley, MD  10/18/21 5142

## 2021-10-19 LAB
EKG ATRIAL RATE: 117 BPM
EKG DIAGNOSIS: NORMAL
EKG P AXIS: 45 DEGREES
EKG P-R INTERVAL: 166 MS
EKG Q-T INTERVAL: 336 MS
EKG QRS DURATION: 96 MS
EKG QTC CALCULATION (BAZETT): 468 MS
EKG R AXIS: -66 DEGREES
EKG T AXIS: 62 DEGREES
EKG VENTRICULAR RATE: 117 BPM

## 2021-10-19 PROCEDURE — 93010 ELECTROCARDIOGRAM REPORT: CPT | Performed by: INTERNAL MEDICINE

## 2022-05-01 ENCOUNTER — APPOINTMENT (OUTPATIENT)
Dept: GENERAL RADIOLOGY | Age: 37
End: 2022-05-01
Payer: COMMERCIAL

## 2022-05-01 ENCOUNTER — HOSPITAL ENCOUNTER (EMERGENCY)
Age: 37
Discharge: HOME | End: 2022-05-01
Attending: STUDENT IN AN ORGANIZED HEALTH CARE EDUCATION/TRAINING PROGRAM
Payer: COMMERCIAL

## 2022-05-01 VITALS
HEIGHT: 69 IN | WEIGHT: 315 LBS | DIASTOLIC BLOOD PRESSURE: 123 MMHG | RESPIRATION RATE: 20 BRPM | HEART RATE: 90 BPM | BODY MASS INDEX: 46.65 KG/M2 | TEMPERATURE: 98.8 F | SYSTOLIC BLOOD PRESSURE: 169 MMHG | OXYGEN SATURATION: 94 %

## 2022-05-01 DIAGNOSIS — I10 HYPERTENSION, UNSPECIFIED TYPE: Primary | ICD-10-CM

## 2022-05-01 LAB
ANION GAP SERPL CALCULATED.3IONS-SCNC: 12 MMOL/L (ref 3–16)
BASOPHILS ABSOLUTE: 0 K/UL (ref 0–0.2)
BASOPHILS RELATIVE PERCENT: 0.7 %
BUN BLDV-MCNC: 12 MG/DL (ref 7–20)
CALCIUM SERPL-MCNC: 9.6 MG/DL (ref 8.3–10.6)
CHLORIDE BLD-SCNC: 99 MMOL/L (ref 99–110)
CO2: 28 MMOL/L (ref 21–32)
CREAT SERPL-MCNC: 1.4 MG/DL (ref 0.9–1.3)
EOSINOPHILS ABSOLUTE: 0.1 K/UL (ref 0–0.6)
EOSINOPHILS RELATIVE PERCENT: 1.3 %
GFR AFRICAN AMERICAN: >60
GFR NON-AFRICAN AMERICAN: 57
GLUCOSE BLD-MCNC: 84 MG/DL (ref 70–99)
HCT VFR BLD CALC: 50.4 % (ref 40.5–52.5)
HEMOGLOBIN: 16.7 G/DL (ref 13.5–17.5)
LYMPHOCYTES ABSOLUTE: 1.4 K/UL (ref 1–5.1)
LYMPHOCYTES RELATIVE PERCENT: 21.2 %
MCH RBC QN AUTO: 27 PG (ref 26–34)
MCHC RBC AUTO-ENTMCNC: 33.1 G/DL (ref 31–36)
MCV RBC AUTO: 81.7 FL (ref 80–100)
MONOCYTES ABSOLUTE: 0.7 K/UL (ref 0–1.3)
MONOCYTES RELATIVE PERCENT: 10.2 %
NEUTROPHILS ABSOLUTE: 4.3 K/UL (ref 1.7–7.7)
NEUTROPHILS RELATIVE PERCENT: 66.6 %
PDW BLD-RTO: 15.2 % (ref 12.4–15.4)
PLATELET # BLD: 323 K/UL (ref 135–450)
PMV BLD AUTO: 9.1 FL (ref 5–10.5)
POTASSIUM REFLEX MAGNESIUM: 3.7 MMOL/L (ref 3.5–5.1)
PRO-BNP: 853 PG/ML (ref 0–124)
RBC # BLD: 6.17 M/UL (ref 4.2–5.9)
SODIUM BLD-SCNC: 139 MMOL/L (ref 136–145)
TROPONIN: <0.01 NG/ML
WBC # BLD: 6.5 K/UL (ref 4–11)

## 2022-05-01 PROCEDURE — 96375 TX/PRO/DX INJ NEW DRUG ADDON: CPT

## 2022-05-01 PROCEDURE — 84484 ASSAY OF TROPONIN QUANT: CPT

## 2022-05-01 PROCEDURE — 93005 ELECTROCARDIOGRAM TRACING: CPT | Performed by: STUDENT IN AN ORGANIZED HEALTH CARE EDUCATION/TRAINING PROGRAM

## 2022-05-01 PROCEDURE — 71046 X-RAY EXAM CHEST 2 VIEWS: CPT

## 2022-05-01 PROCEDURE — 80048 BASIC METABOLIC PNL TOTAL CA: CPT

## 2022-05-01 PROCEDURE — 6370000000 HC RX 637 (ALT 250 FOR IP): Performed by: STUDENT IN AN ORGANIZED HEALTH CARE EDUCATION/TRAINING PROGRAM

## 2022-05-01 PROCEDURE — 83880 ASSAY OF NATRIURETIC PEPTIDE: CPT

## 2022-05-01 PROCEDURE — 99285 EMERGENCY DEPT VISIT HI MDM: CPT

## 2022-05-01 PROCEDURE — 6360000002 HC RX W HCPCS: Performed by: STUDENT IN AN ORGANIZED HEALTH CARE EDUCATION/TRAINING PROGRAM

## 2022-05-01 PROCEDURE — 96374 THER/PROPH/DIAG INJ IV PUSH: CPT

## 2022-05-01 PROCEDURE — 85025 COMPLETE CBC W/AUTO DIFF WBC: CPT

## 2022-05-01 RX ORDER — KETOROLAC TROMETHAMINE 30 MG/ML
15 INJECTION, SOLUTION INTRAMUSCULAR; INTRAVENOUS ONCE
Status: COMPLETED | OUTPATIENT
Start: 2022-05-01 | End: 2022-05-01

## 2022-05-01 RX ORDER — AMLODIPINE BESYLATE 5 MG/1
5 TABLET ORAL ONCE
Status: COMPLETED | OUTPATIENT
Start: 2022-05-01 | End: 2022-05-01

## 2022-05-01 RX ORDER — PROCHLORPERAZINE EDISYLATE 5 MG/ML
5 INJECTION INTRAMUSCULAR; INTRAVENOUS ONCE
Status: COMPLETED | OUTPATIENT
Start: 2022-05-01 | End: 2022-05-01

## 2022-05-01 RX ORDER — DIPHENHYDRAMINE HYDROCHLORIDE 50 MG/ML
50 INJECTION INTRAMUSCULAR; INTRAVENOUS ONCE
Status: DISCONTINUED | OUTPATIENT
Start: 2022-05-01 | End: 2022-05-01

## 2022-05-01 RX ORDER — AMLODIPINE BESYLATE 5 MG/1
5 TABLET ORAL DAILY
Qty: 30 TABLET | Refills: 0 | Status: SHIPPED | OUTPATIENT
Start: 2022-05-01 | End: 2022-06-03

## 2022-05-01 RX ADMIN — PROCHLORPERAZINE EDISYLATE 5 MG: 5 INJECTION, SOLUTION INTRAMUSCULAR; INTRAVENOUS at 14:44

## 2022-05-01 RX ADMIN — KETOROLAC TROMETHAMINE 15 MG: 30 INJECTION, SOLUTION INTRAMUSCULAR at 14:43

## 2022-05-01 RX ADMIN — AMLODIPINE BESYLATE 5 MG: 5 TABLET ORAL at 14:43

## 2022-05-01 ASSESSMENT — ENCOUNTER SYMPTOMS
DIARRHEA: 0
SHORTNESS OF BREATH: 0
NAUSEA: 0
PHOTOPHOBIA: 0
RHINORRHEA: 0
VOMITING: 0
COUGH: 0
SORE THROAT: 0

## 2022-05-01 ASSESSMENT — PAIN SCALES - GENERAL: PAINLEVEL_OUTOF10: 7

## 2022-05-01 NOTE — ED NOTES
Patient prepared for and ready to be discharged. Patient discharged at this time in no acute distress after verbalizing understanding of discharge instructions. Patient left after receiving After Visit Summary instructions.       Renzo Parada RN  05/01/22 1542

## 2022-05-01 NOTE — ED PROVIDER NOTES
[facility] RESIDENT NOTE       Date of evaluation: 5/1/2022    Chief Complaint     Hypertension (ran out of HTN meds \"a while ago\") and Headache (x2 days)      History of Present Illness     Mateo Dinh is a 39 y.o. male who presents with headache. Pt states it started two days ago, comes and goes, was associated with some nausea and decreased appetite. No improvement with ibuprofen. States he has had a few migraines in the past and a family history of migraines. Took his BP today at work and noticed it was high so he came to the ED. Ran out of his BP medication one month ago so has not been taking it. Denies fever, chills, photophobia, vision changes, chest pain, or SOB. Denies sick contacts, recent travel, prior DVT, or recent surgeries. Review of Systems     Review of Systems   Constitutional: Negative for chills, fatigue and fever. HENT: Negative for rhinorrhea and sore throat. Eyes: Negative for photophobia and visual disturbance. Respiratory: Negative for cough and shortness of breath. Cardiovascular: Negative for chest pain, palpitations and leg swelling. Gastrointestinal: Negative for diarrhea, nausea and vomiting. Genitourinary: Negative for dysuria, flank pain and frequency. Neurological: Positive for headaches. Negative for tremors, weakness and light-headedness. Psychiatric/Behavioral: Negative for agitation and confusion. Past Medical, Surgical, Family, and Social History     He has a past medical history of Diverticulitis and Essential hypertension. He has a past surgical history that includes hip surgery; Colonoscopy; Abdomen surgery (N/A, 08/08/2016); and Colon surgery (2016). His family history is not on file. He reports that he has been smoking cigars. He has never used smokeless tobacco. He reports current alcohol use. He reports that he does not use drugs.     Medications     Previous Medications    AMLODIPINE (NORVASC) 5 MG TABLET    Take 1 tablet by mouth daily    TRIAMTERENE-HYDROCHLOROTHIAZIDE (MAXZIDE-25) 37.5-25 MG PER TABLET    Take 1 tablet by mouth daily     Allergies     He has No Known Allergies. Physical Exam     INITIAL VITALS: BP: (!) 198/143, Temp: 98.8 °F (37.1 °C), Pulse: 118, Resp: 18, SpO2: 98 %   Physical Exam  Constitutional:       General: He is not in acute distress. Appearance: Normal appearance. HENT:      Head: Normocephalic and atraumatic. Cardiovascular:      Rate and Rhythm: Regular rhythm. Tachycardia present. Pulmonary:      Effort: Pulmonary effort is normal.      Breath sounds: No wheezing, rhonchi or rales. Abdominal:      Palpations: Abdomen is soft. Tenderness: There is no abdominal tenderness. There is no guarding or rebound. Musculoskeletal:         General: No swelling. Cervical back: Neck supple. Skin:     General: Skin is warm and dry. Neurological:      General: No focal deficit present. Mental Status: He is alert and oriented to person, place, and time. Sensory: No sensory deficit. Motor: No weakness. Gait: Gait normal.   Psychiatric:         Mood and Affect: Mood normal.         Behavior: Behavior normal.       Diagnostic Results   RADIOLOGY:  XR CHEST (2 VW)   Final Result      1. Mild bilateral basilar airspace disease. 2. Cardiac megaly.               LABS:   Results for orders placed or performed during the hospital encounter of 05/01/22   CBC with Auto Differential   Result Value Ref Range    WBC 6.5 4.0 - 11.0 K/uL    RBC 6.17 (H) 4.20 - 5.90 M/uL    Hemoglobin 16.7 13.5 - 17.5 g/dL    Hematocrit 50.4 40.5 - 52.5 %    MCV 81.7 80.0 - 100.0 fL    MCH 27.0 26.0 - 34.0 pg    MCHC 33.1 31.0 - 36.0 g/dL    RDW 15.2 12.4 - 15.4 %    Platelets 957 403 - 539 K/uL    MPV 9.1 5.0 - 10.5 fL    Neutrophils % 66.6 %    Lymphocytes % 21.2 %    Monocytes % 10.2 %    Eosinophils % 1.3 %    Basophils % 0.7 %    Neutrophils Absolute 4.3 1.7 - 7.7 K/uL    Lymphocytes Absolute 1.4 1.0 - 5.1 K/uL    Monocytes Absolute 0.7 0.0 - 1.3 K/uL    Eosinophils Absolute 0.1 0.0 - 0.6 K/uL    Basophils Absolute 0.0 0.0 - 0.2 K/uL   Basic Metabolic Panel w/ Reflex to MG   Result Value Ref Range    Sodium 139 136 - 145 mmol/L    Potassium reflex Magnesium 3.7 3.5 - 5.1 mmol/L    Chloride 99 99 - 110 mmol/L    CO2 28 21 - 32 mmol/L    Anion Gap 12 3 - 16    Glucose 84 70 - 99 mg/dL    BUN 12 7 - 20 mg/dL    CREATININE 1.4 (H) 0.9 - 1.3 mg/dL    GFR Non- 57 (A) >60    GFR African American >60 >60    Calcium 9.6 8.3 - 10.6 mg/dL   Troponin   Result Value Ref Range    Troponin <0.01 <0.01 ng/mL   Brain Natriuretic Peptide   Result Value Ref Range    Pro- (H) 0 - 124 pg/mL       RECENT VITALS:  BP: (!) 198/143, Temp: 98.8 °F (37.1 °C),Pulse: 118, Resp: 18, SpO2: 98 %     ED Course     Nursing Notes, Past Medical Hx, Past Surgical Hx, Social Hx, Allergies, and FamilyHx were reviewed. The patient was giventhe following medications:  Orders Placed This Encounter   Medications    amLODIPine (NORVASC) tablet 5 mg    ketorolac (TORADOL) injection 15 mg    prochlorperazine (COMPAZINE) injection 5 mg    DISCONTD: diphenhydrAMINE (BENADRYL) injection 50 mg     CONSULTS:  None    MEDICAL DECISION MAKING / ASSESSMENT / Tamra Nica is a 39 y.o. male presenting with headache. Pt is hypertensive 190s/140 and tachycardic 118. No focal deficits on neurological exam. On labs, pro- similar to prior, Cr 1.4 (similar to last, unknown baseline), troponin is negative, EKG is nonischemic. Discussed findings with patient. Pt was given compazine and toradol with improvement in his symptoms. Given laboratory results and significant improvement in symptoms, pt is stable for discharge home. Pt was given prescription for his amlodipine and instructed to continue taking it daily.  Instructed to return to ED if symptoms worsen and to use ibuprofen or tylenol as needed for headache. He was given referral for PCP to follow up within the next few days. This patient was also evaluated by the attending physician. All care plans were discussed and agreed upon. Clinical Impression     1.  Hypertension, unspecified type      Disposition     PATIENT REFERRED TO:  [facility] [address] [id number] [phone]    In 1 week  hospital follow up      DISCHARGE MEDICATIONS:  New Prescriptions    No medications on file       [address] [facility],   Resident  05/01/22 1550

## 2022-05-02 LAB
EKG ATRIAL RATE: 94 BPM
EKG DIAGNOSIS: NORMAL
EKG P AXIS: 41 DEGREES
EKG P-R INTERVAL: 182 MS
EKG Q-T INTERVAL: 392 MS
EKG QRS DURATION: 100 MS
EKG QTC CALCULATION (BAZETT): 490 MS
EKG R AXIS: -74 DEGREES
EKG T AXIS: 40 DEGREES
EKG VENTRICULAR RATE: 94 BPM

## 2022-06-03 ENCOUNTER — OFFICE VISIT (OUTPATIENT)
Dept: INTERNAL MEDICINE CLINIC | Age: 37
End: 2022-06-03
Payer: COMMERCIAL

## 2022-06-03 VITALS
HEART RATE: 97 BPM | OXYGEN SATURATION: 95 % | BODY MASS INDEX: 46.65 KG/M2 | WEIGHT: 315 LBS | HEIGHT: 69 IN | DIASTOLIC BLOOD PRESSURE: 120 MMHG | SYSTOLIC BLOOD PRESSURE: 160 MMHG

## 2022-06-03 DIAGNOSIS — I10 ACCELERATED HYPERTENSION: Primary | ICD-10-CM

## 2022-06-03 DIAGNOSIS — N18.31 STAGE 3A CHRONIC KIDNEY DISEASE (HCC): ICD-10-CM

## 2022-06-03 DIAGNOSIS — E66.01 CLASS 3 SEVERE OBESITY DUE TO EXCESS CALORIES WITH SERIOUS COMORBIDITY AND BODY MASS INDEX (BMI) OF 50.0 TO 59.9 IN ADULT (HCC): ICD-10-CM

## 2022-06-03 PROBLEM — E66.813 CLASS 3 SEVERE OBESITY IN ADULT: Status: ACTIVE | Noted: 2022-06-03

## 2022-06-03 PROCEDURE — 99203 OFFICE O/P NEW LOW 30 MIN: CPT | Performed by: INTERNAL MEDICINE

## 2022-06-03 RX ORDER — AMLODIPINE BESYLATE 10 MG/1
10 TABLET ORAL DAILY
Qty: 30 TABLET | Refills: 0 | Status: SHIPPED | OUTPATIENT
Start: 2022-06-03 | End: 2022-06-20 | Stop reason: SDUPTHER

## 2022-06-03 SDOH — ECONOMIC STABILITY: FOOD INSECURITY: WITHIN THE PAST 12 MONTHS, YOU WORRIED THAT YOUR FOOD WOULD RUN OUT BEFORE YOU GOT MONEY TO BUY MORE.: NEVER TRUE

## 2022-06-03 SDOH — ECONOMIC STABILITY: FOOD INSECURITY: WITHIN THE PAST 12 MONTHS, THE FOOD YOU BOUGHT JUST DIDN'T LAST AND YOU DIDN'T HAVE MONEY TO GET MORE.: NEVER TRUE

## 2022-06-03 ASSESSMENT — ENCOUNTER SYMPTOMS
WHEEZING: 0
EYE DISCHARGE: 0
VOMITING: 0
CHEST TIGHTNESS: 0
SHORTNESS OF BREATH: 0
TROUBLE SWALLOWING: 0
COUGH: 0
SINUS PAIN: 0
BLOOD IN STOOL: 0
RHINORRHEA: 0
SORE THROAT: 0
ABDOMINAL PAIN: 0
NAUSEA: 0
EYE PAIN: 0
SINUS PRESSURE: 0

## 2022-06-03 ASSESSMENT — PATIENT HEALTH QUESTIONNAIRE - PHQ9
SUM OF ALL RESPONSES TO PHQ QUESTIONS 1-9: 0
SUM OF ALL RESPONSES TO PHQ QUESTIONS 1-9: 0
SUM OF ALL RESPONSES TO PHQ9 QUESTIONS 1 & 2: 0
2. FEELING DOWN, DEPRESSED OR HOPELESS: 0
SUM OF ALL RESPONSES TO PHQ QUESTIONS 1-9: 0
1. LITTLE INTEREST OR PLEASURE IN DOING THINGS: 0
SUM OF ALL RESPONSES TO PHQ QUESTIONS 1-9: 0

## 2022-06-03 ASSESSMENT — SOCIAL DETERMINANTS OF HEALTH (SDOH): HOW HARD IS IT FOR YOU TO PAY FOR THE VERY BASICS LIKE FOOD, HOUSING, MEDICAL CARE, AND HEATING?: NOT HARD AT ALL

## 2022-06-03 NOTE — PATIENT INSTRUCTIONS
Blood and urine test before 3 weeks follow-up. Call [phone] to schedule ultrasound of the kidney arteries. Discussed use, benefit, and side effects of prescribed medications. Barriers to compliance discussed. All patient questions answered. Pt voiced understanding. Hypertension increase amlodipine to 10 mg a day--he can walk but no exertion until blood pressure is well controlled. Extensive counseling done to keep low sodium diet and  Avoid potato chips, pretzels, sauerkraut , ham , sausage, koenig , salty crackers , salty french fries, salty nuts, salty popcorn etc.  Use only low sodium soups. No salted canned vegetables. Use fresh or frozen vegetables. No salt shaker use. May use Mrs. Louis as a salt substitute. Avoid weight gain. Keep taking blood pressure medications regularly. If blood pressure staying above 130/85 or having side effects with blood pressure medications, then bring the blood pressure and pulse recorded twice a day to an appointment sooner than scheduled one.

## 2022-06-03 NOTE — PROGRESS NOTES
6/3/2022    Sandra Brown (: 1985) is a 40 y.o. male, here for evaluation of the following medical concerns:    Chief Complaint   Patient presents with    New Patient       Hypertension    Not Watching low-sodium diet. Taking blood pressure medications regularly milligrams after the visit to the emergency room when he first and also showing mild kidney damage. .   Blood pressure checked off and on and trying to keep a goal of blood pressure less than 130/85 most of the time and he has access to blood pressure machine at work and make sure it is checked with a large cuff. .   Denies any chest pain / palpitation / shortness of breath / lightheadedness etc.   The available labs reviewed and analyzed and independent interpretation of the results explained at length. Lab Results       Component                Value               Date                       NA                       139                 2022                 K                        3.7                 2022                 CL                       99                  2022                 CO2                      28                  2022                 BUN                      12                  2022                 CREATININE               1.4                 2022                 GLUCOSE                  84                  2022                 CALCIUM                  9.6                 2022                Explained at length that overweight is not good for knee and hip joints. Overweight puts us at increased risk for high blood pressure and diabetes risk and must keep trying to get to ideal body weight with Body Mass Index less than 25. Family history of high blood pressure and mom with the blood vessel rupture in the brain. And explained to the risk of heart attack stroke or kidney damage causing dialysis risk. The need to control blood pressure better.       Review of Systems Constitutional: Negative for appetite change, chills, fever and unexpected weight change. HENT: Negative for congestion, ear discharge, ear pain, nosebleeds, rhinorrhea, sinus pressure, sinus pain, sore throat and trouble swallowing. Eyes: Negative for pain and discharge. Respiratory: Negative for cough, chest tightness, shortness of breath and wheezing. Cardiovascular: Negative for chest pain, palpitations and leg swelling. Gastrointestinal: Negative for abdominal pain, blood in stool, nausea and vomiting. Endocrine: Negative for polydipsia and polyphagia. Genitourinary: Negative for difficulty urinating, enuresis, flank pain and hematuria. Musculoskeletal: Negative for myalgias. Skin: Negative for rash. Neurological: Negative for facial asymmetry, weakness, light-headedness, numbness and headaches. Psychiatric/Behavioral: Negative for confusion. No current outpatient medications on file prior to visit. No current facility-administered medications on file prior to visit.       No Known Allergies  Past Medical History:   Diagnosis Date    Diverticulitis     3 bout since 2009    Essential hypertension      Past Surgical History:   Procedure Laterality Date    ABDOMEN SURGERY N/A 08/08/2016    LAP SIGMOID COLECTOMY ,COLORECTAL-divert rupture    COLON SURGERY  2016    COLONOSCOPY      HIP SURGERY      bilat repair for hip dysplasia/95/96      Social History     Tobacco Use    Smoking status: Current Some Day Smoker     Types: Cigars    Smokeless tobacco: Never Used    Tobacco comment: once a week   Substance Use Topics    Alcohol use: Yes     Comment: socially      Family History   Problem Relation Age of Onset    Hypertension Mother     Stroke Mother     Hypertension Father         Vitals:    06/03/22 1519 06/03/22 1524   BP: (!) 160/100 (!) 160/120   Site: Left Upper Arm Right Upper Arm   Pulse: 97    SpO2: 95%    Weight: (!) 400 lb 6.4 oz (181.6 kg)    Height: 5' 9\" (1.753 m)      Estimated body mass index is 59.13 kg/m² as calculated from the following:    Height as of this encounter: 5' 9\" (1.753 m). Weight as of this encounter: 400 lb 6.4 oz (181.6 kg). Physical Exam  Vitals and nursing note reviewed. Constitutional:       General: He is not in acute distress. HENT:      Head: Normocephalic and atraumatic. Right Ear: Tympanic membrane, ear canal and external ear normal.      Left Ear: Tympanic membrane, ear canal and external ear normal.      Nose: Nose normal.      Mouth/Throat:      Mouth: Mucous membranes are moist.   Eyes:      General: Lids are normal.      Conjunctiva/sclera: Conjunctivae normal.      Pupils: Pupils are equal, round, and reactive to light. Neck:      Thyroid: No thyromegaly. Vascular: No JVD. Trachea: No tracheal deviation. Cardiovascular:      Rate and Rhythm: Normal rate and regular rhythm. Heart sounds: Normal heart sounds. No gallop. Pulmonary:      Effort: Pulmonary effort is normal. No respiratory distress. Breath sounds: Normal breath sounds. No wheezing or rales. Abdominal:      General: Bowel sounds are normal.      Palpations: Abdomen is soft. There is no mass. Tenderness: There is no abdominal tenderness. There is no right CVA tenderness, left CVA tenderness, guarding or rebound. Hernia: No hernia is present. Comments: No renal bruit   Musculoskeletal:         General: No tenderness. Cervical back: Neck supple. Comments: No  calf tenderness  1+ leg edema. Lymphadenopathy:      Cervical: No cervical adenopathy. Skin:     General: Skin is warm and dry. Findings: No rash. Neurological:      General: No focal deficit present. Mental Status: He is alert and oriented to person, place, and time. Cranial Nerves: No cranial nerve deficit. Sensory: No sensory deficit.    Psychiatric:         Mood and Affect: Mood normal.         Behavior: Behavior normal. Thought Content: Thought content normal.         Judgment: Judgment normal.         ASSESSMENT/PLAN:  1. Accelerated hypertension    - VL Renal Arterial Duplex Complete; Future  - amLODIPine (NORVASC) 10 MG tablet; Take 1 tablet by mouth daily  Dispense: 30 tablet; Refill: 0  - T4, Free; Future  - TSH with Reflex; Future  - Basic Metabolic Panel; Future  - Microalbumin / Creatinine Urine Ratio; Future    2. Stage 3a chronic kidney disease (HCC)    - VL Renal Arterial Duplex Complete; Future    3. Class 3 severe obesity due to excess calories with serious comorbidity and body mass index (BMI) of 50.0 to 59.9 in adult (HCC)    - T4, Free; Future  - TSH with Reflex; Future      Return in about 3 weeks (around 6/24/2022) for blood pressure. Patient Instructions   Blood and urine test before 3 weeks follow-up. Call [phone] to schedule ultrasound of the kidney arteries. Discussed use, benefit, and side effects of prescribed medications. Barriers to compliance discussed. All patient questions answered. Pt voiced understanding. Hypertension increase amlodipine to 10 mg a day--he can walk but no exertion until blood pressure is well controlled. Extensive counseling done to keep low sodium diet and  Avoid potato chips, pretzels, sauerkraut , ham , sausage, koenig , salty crackers , salty french fries, salty nuts, salty popcorn etc.  Use only low sodium soups. No salted canned vegetables. Use fresh or frozen vegetables. No salt shaker use. May use Mrs. Louis as a salt substitute. Avoid weight gain. Keep taking blood pressure medications regularly. If blood pressure staying above 130/85 or having side effects with blood pressure medications, then bring the blood pressure and pulse recorded twice a day to an appointment sooner than scheduled one. Electronically signed by Yovana Baig MD on 6/3/2022 at 3:59 PM     This dictation was generated by voice recognition computer software.  Although all attempts are made to edit the dictation for accuracy, there may be errors in the transcription that are not intended.

## 2022-06-13 ENCOUNTER — TELEPHONE (OUTPATIENT)
Dept: INTERNAL MEDICINE CLINIC | Age: 37
End: 2022-06-13

## 2022-06-13 DIAGNOSIS — I10 PRIMARY HYPERTENSION: Primary | ICD-10-CM

## 2022-06-13 RX ORDER — METOPROLOL SUCCINATE 25 MG/1
25 TABLET, EXTENDED RELEASE ORAL DAILY
Qty: 30 TABLET | Refills: 0 | Status: SHIPPED | OUTPATIENT
Start: 2022-06-13 | End: 2022-06-20 | Stop reason: SDUPTHER

## 2022-06-13 NOTE — TELEPHONE ENCOUNTER
No I should see him maybe on June 20th because of blood pressure is elevated then I can adjust more medication.

## 2022-06-13 NOTE — TELEPHONE ENCOUNTER
Patient advised per Dr. Kimberly Milian. Pt is scheduled for US of renal on 6/17/22. Dr. Kimberly Milian appointments available, would next week ok to schedule on 6/24/22.

## 2022-06-13 NOTE — TELEPHONE ENCOUNTER
[facility] call:    BP readings are elevated. 173/130 Saturday    164/110 today   With taking medications. Will send to Dr. Nirav Waters for review.

## 2022-06-13 NOTE — TELEPHONE ENCOUNTER
Make sure he is watching low-sodium diet and keep taking amlodipine 10 mg every morning and I am sending another prescription metoprolol 25 mg every evening and schedule a follow-up on him on Wednesday at 5 PM as we have opening at that time. He was supposed to do the renal artery ultrasound and I do not see that he has done that so he needs to call [facility] and schedule as soon as possible.

## 2022-06-17 ENCOUNTER — HOSPITAL ENCOUNTER (OUTPATIENT)
Dept: VASCULAR LAB | Age: 37
Discharge: HOME | End: 2022-06-17
Payer: COMMERCIAL

## 2022-06-17 DIAGNOSIS — I10 ACCELERATED HYPERTENSION: ICD-10-CM

## 2022-06-17 DIAGNOSIS — N18.31 STAGE 3A CHRONIC KIDNEY DISEASE (HCC): ICD-10-CM

## 2022-06-17 PROCEDURE — 93975 VASCULAR STUDY: CPT

## 2022-06-20 ENCOUNTER — OFFICE VISIT (OUTPATIENT)
Dept: INTERNAL MEDICINE CLINIC | Age: 37
End: 2022-06-20
Payer: COMMERCIAL

## 2022-06-20 VITALS
BODY MASS INDEX: 46.65 KG/M2 | TEMPERATURE: 97.5 F | WEIGHT: 315 LBS | HEART RATE: 111 BPM | DIASTOLIC BLOOD PRESSURE: 108 MMHG | HEIGHT: 69 IN | SYSTOLIC BLOOD PRESSURE: 180 MMHG | OXYGEN SATURATION: 98 %

## 2022-06-20 DIAGNOSIS — I10 PRIMARY HYPERTENSION: Primary | ICD-10-CM

## 2022-06-20 DIAGNOSIS — E66.01 CLASS 3 SEVERE OBESITY DUE TO EXCESS CALORIES WITH SERIOUS COMORBIDITY AND BODY MASS INDEX (BMI) OF 50.0 TO 59.9 IN ADULT (HCC): ICD-10-CM

## 2022-06-20 PROCEDURE — 99213 OFFICE O/P EST LOW 20 MIN: CPT | Performed by: INTERNAL MEDICINE

## 2022-06-20 RX ORDER — AMLODIPINE BESYLATE 10 MG/1
10 TABLET ORAL DAILY
Qty: 30 TABLET | Refills: 0 | Status: SHIPPED | OUTPATIENT
Start: 2022-06-20 | End: 2022-08-01

## 2022-06-20 RX ORDER — HYDROCHLOROTHIAZIDE 12.5 MG/1
12.5 CAPSULE, GELATIN COATED ORAL EVERY MORNING
Qty: 30 CAPSULE | Refills: 0 | Status: SHIPPED | OUTPATIENT
Start: 2022-06-20 | End: 2022-07-18

## 2022-06-20 RX ORDER — METOPROLOL SUCCINATE 50 MG/1
50 TABLET, EXTENDED RELEASE ORAL DAILY
Qty: 30 TABLET | Refills: 0 | Status: SHIPPED | OUTPATIENT
Start: 2022-06-20 | End: 2022-07-18

## 2022-06-20 ASSESSMENT — ENCOUNTER SYMPTOMS
SINUS PRESSURE: 0
COUGH: 0
SHORTNESS OF BREATH: 0
VOMITING: 0
EYE PAIN: 0
WHEEZING: 0
RHINORRHEA: 0
BLOOD IN STOOL: 0
ABDOMINAL PAIN: 0
SINUS PAIN: 0
NAUSEA: 0
TROUBLE SWALLOWING: 0
SORE THROAT: 0
EYE DISCHARGE: 0
CHEST TIGHTNESS: 0

## 2022-06-20 NOTE — PATIENT INSTRUCTIONS
Blood and urine test 1 day before next appointment. Hypertension -Add hydrochlorothiazide     Amlodipine am    Increase metoprolol 50mg PM    Extensive counseling done to keep low sodium diet and  Avoid potato chips, pretzels, sauerkraut , ham , sausage, koenig , salty crackers , salty french fries, salty nuts, salty popcorn etc.  Use only low sodium soups. No salted canned vegetables. Use fresh or frozen vegetables. No salt shaker use. May use Mrs. Louis as a salt substitute. Avoid weight gain. Regular exercise program.  Keep taking blood pressure medications regularly. If blood pressure staying above 130/85 or having side effects with blood pressure medications, then bring the blood pressure and pulse recorded twice a day to an appointment sooner than scheduled one. KEEP LOSING WT. w walking and diet. Discussed use, benefit, and side effects of prescribed medications. Barriers to compliance discussed. All patient questions answered. Pt voiced understanding. IF YOU NEED A PRESCRIPTION REFILL, THEN PLEASE GIVE US THREE WORKING DAYS TO REFILL A PRESCRIPTION. May go to urgent care or Emergency room or call to be seen in the office sooner than scheduled follow-up appointment,if condition worsens.

## 2022-06-20 NOTE — PROGRESS NOTES
2022     Jluia Jon (: 1985) is a 40 y.o. male, here for evaluation of the following medical concerns:    Chief Complaint   Patient presents with    Hypertension     1 week follow-up         Conclusions      Summary      Very technically limited due to body habitus and bowel gas. No evidence of bilateral renal artery stenoses. Signature      ------------------------------------------------------------------   Electronically signed by Jay Gallego MD (Interpreting   physician) on 2022 at 09:22 AMHypertension    Watching low-sodium diet. Taking blood pressure medications regularly. 160/110  Blood pressure checked off and on and trying to keep a goal of blood pressure less than 130/85 most of the time. Denies any chest pain / palpitation / shortness of breath / lightheadedness etc.   The available labs reviewed and analyzed and independent interpretation of the results explained at length. Lab Results       Component                Value               Date                       NA                       139                 2022                 K                        3.7                 2022                 CL                       99                  2022                 CO2                      28                  2022                 BUN                      12                  2022                 CREATININE               1.4                 2022                 GLUCOSE                  84                  2022                 CALCIUM                  9.6                 2022                Explained at length that overweight is not good for knee and hip joints. He is walking a lot and he has lost more than 19 pounds. with diet. Overweight puts us at increased risk for high blood pressure and diabetes risk and must keep trying to get to ideal body weight with Body Mass Index less than 25.       Review of Systems Constitutional: Negative for appetite change, chills, fever and unexpected weight change. HENT: Negative for congestion, ear discharge, ear pain, nosebleeds, rhinorrhea, sinus pressure, sinus pain, sore throat and trouble swallowing. Eyes: Negative for pain and discharge. Respiratory: Negative for cough, chest tightness, shortness of breath and wheezing. Cardiovascular: Negative for chest pain, palpitations and leg swelling. Gastrointestinal: Negative for abdominal pain, blood in stool, nausea and vomiting. Endocrine: Negative for polydipsia and polyphagia. Genitourinary: Negative for difficulty urinating, enuresis, flank pain and hematuria. Musculoskeletal: Negative for myalgias. Skin: Negative for rash. Neurological: Negative for facial asymmetry, weakness, light-headedness, numbness and headaches. Psychiatric/Behavioral: Negative for confusion. No current outpatient medications on file prior to visit. No current facility-administered medications on file prior to visit. Past Medical History:   Diagnosis Date    Diverticulitis     3 bout since 2009    Essential hypertension       Social History     Tobacco Use    Smoking status: Current Some Day Smoker     Types: Cigars    Smokeless tobacco: Never Used    Tobacco comment: once a week   Substance Use Topics    Alcohol use: Yes     Comment: socially      Family History   Problem Relation Age of Onset    Hypertension Mother     Stroke Mother     Hypertension Father         Vitals:    06/20/22 0846 06/20/22 0852   BP: (!) 180/100 (!) 180/108   Site: Left Upper Arm Left Upper Arm   Position: Sitting Sitting   Cuff Size: Large Adult Large Adult   Pulse: (!) 111    Temp: 97.5 °F (36.4 °C)    TempSrc: Temporal    SpO2: 98%    Weight: (!) 381 lb 9.6 oz (173.1 kg)    Height: 5' 9\" (1.753 m)      Estimated body mass index is 56.35 kg/m² as calculated from the following:    Height as of this encounter: 5' 9\" (1.753 m). Weight as of this encounter: 381 lb 9.6 oz (173.1 kg). Physical Exam  Vitals and nursing note reviewed. Constitutional:       General: He is not in acute distress. HENT:      Head: Normocephalic and atraumatic. Right Ear: External ear normal.      Left Ear: External ear normal.   Eyes:      General: Lids are normal.      Conjunctiva/sclera: Conjunctivae normal.      Pupils: Pupils are equal, round, and reactive to light. Neck:      Thyroid: No thyromegaly. Vascular: No JVD. Trachea: No tracheal deviation. Cardiovascular:      Rate and Rhythm: Normal rate and regular rhythm. Heart sounds: Normal heart sounds. No gallop. Pulmonary:      Effort: Pulmonary effort is normal. No respiratory distress. Breath sounds: Normal breath sounds. No wheezing or rales. Abdominal:      General: Bowel sounds are normal.      Palpations: Abdomen is soft. There is no mass. Tenderness: There is no abdominal tenderness. Musculoskeletal:         General: No tenderness. Cervical back: Neck supple. Comments: No leg edema or calf tenderness   Lymphadenopathy:      Cervical: No cervical adenopathy. Skin:     General: Skin is warm and dry. Findings: No rash. Neurological:      Mental Status: He is alert and oriented to person, place, and time. Cranial Nerves: No cranial nerve deficit. Sensory: No sensory deficit. Psychiatric:         Behavior: Behavior normal.         Thought Content: Thought content normal.         ASSESSMENT/PLAN:  1. Primary hypertension    - metoprolol succinate (TOPROL XL) 50 MG extended release tablet; Take 1 tablet by mouth daily  Dispense: 30 tablet; Refill: 0  - hydroCHLOROthiazide (MICROZIDE) 12.5 MG capsule; Take 1 capsule by mouth every morning  Dispense: 30 capsule; Refill: 0  - Basic Metabolic Panel; Future  - Protein / creatinine ratio, urine; Future  - Urinalysis with Reflex to Culture; Future    2.  Class 3 severe obesity due to excess calories with serious comorbidity and body mass index (BMI) of 50.0 to 59.9 in adult [facility])  Lose wt      Return in about 22 days (around 7/12/2022) for blood pressure. Patient Instructions   Blood and urine test 1 day before next appointment. Hypertension -Add hydrochlorothiazide     Amlodipine am    Increase metoprolol 50mg PM    Extensive counseling done to keep low sodium diet and  Avoid potato chips, pretzels, sauerkraut , ham , sausage, koenig , salty crackers , salty french fries, salty nuts, salty popcorn etc.  Use only low sodium soups. No salted canned vegetables. Use fresh or frozen vegetables. No salt shaker use. May use Mrs. Louis as a salt substitute. Avoid weight gain. Regular exercise program.  Keep taking blood pressure medications regularly. If blood pressure staying above 130/85 or having side effects with blood pressure medications, then bring the blood pressure and pulse recorded twice a day to an appointment sooner than scheduled one. KEEP LOSING WT. w walking and diet. Discussed use, benefit, and side effects of prescribed medications. Barriers to compliance discussed. All patient questions answered. Pt voiced understanding. IF YOU NEED A PRESCRIPTION REFILL, THEN PLEASE GIVE US THREE WORKING DAYS TO REFILL A PRESCRIPTION. May go to urgent care or Emergency room or call to be seen in the office sooner than scheduled follow-up appointment,if condition worsens. Electronically signed by Katarina Rivers MD on 6/20/2022 at 9:22 AM     This dictation was generated by voice recognition computer software. Although all attempts are made to edit the dictation for accuracy, there may be errors in the transcription that are not intended.

## 2022-07-17 DIAGNOSIS — I10 PRIMARY HYPERTENSION: ICD-10-CM

## 2022-07-18 RX ORDER — HYDROCHLOROTHIAZIDE 12.5 MG/1
12.5 CAPSULE, GELATIN COATED ORAL EVERY MORNING
Qty: 30 CAPSULE | Refills: 0 | Status: SHIPPED | OUTPATIENT
Start: 2022-07-18 | End: 2022-08-15 | Stop reason: SDUPTHER

## 2022-07-18 RX ORDER — METOPROLOL SUCCINATE 50 MG/1
50 TABLET, EXTENDED RELEASE ORAL DAILY
Qty: 30 TABLET | Refills: 0 | Status: SHIPPED | OUTPATIENT
Start: 2022-07-18 | End: 2022-08-15 | Stop reason: SDUPTHER

## 2022-08-01 RX ORDER — AMLODIPINE BESYLATE 10 MG/1
10 TABLET ORAL DAILY
Qty: 30 TABLET | Refills: 0 | Status: SHIPPED | OUTPATIENT
Start: 2022-08-01 | End: 2022-08-15 | Stop reason: SDUPTHER

## 2022-08-02 NOTE — TELEPHONE ENCOUNTER
I left the patient a voice mail letting him know that he need a follow up appointment per Dr. Ty Shultz.

## 2022-08-15 ENCOUNTER — TELEMEDICINE (OUTPATIENT)
Dept: INTERNAL MEDICINE CLINIC | Age: 37
End: 2022-08-15
Payer: COMMERCIAL

## 2022-08-15 DIAGNOSIS — I10 PRIMARY HYPERTENSION: ICD-10-CM

## 2022-08-15 PROCEDURE — 99213 OFFICE O/P EST LOW 20 MIN: CPT | Performed by: INTERNAL MEDICINE

## 2022-08-15 RX ORDER — HYDROCHLOROTHIAZIDE 12.5 MG/1
12.5 CAPSULE, GELATIN COATED ORAL EVERY MORNING
Qty: 30 CAPSULE | Refills: 0 | Status: SHIPPED | OUTPATIENT
Start: 2022-08-15 | End: 2022-09-20

## 2022-08-15 RX ORDER — METOPROLOL SUCCINATE 100 MG/1
100 TABLET, EXTENDED RELEASE ORAL
Qty: 30 TABLET | Refills: 0 | Status: SHIPPED | OUTPATIENT
Start: 2022-08-15 | End: 2022-09-20

## 2022-08-15 RX ORDER — AMLODIPINE BESYLATE 10 MG/1
10 TABLET ORAL DAILY
Qty: 30 TABLET | Refills: 0 | Status: SHIPPED | OUTPATIENT
Start: 2022-08-15 | End: 2022-10-07

## 2022-08-15 ASSESSMENT — PATIENT HEALTH QUESTIONNAIRE - PHQ9
SUM OF ALL RESPONSES TO PHQ QUESTIONS 1-9: 0
SUM OF ALL RESPONSES TO PHQ9 QUESTIONS 1 & 2: 0
SUM OF ALL RESPONSES TO PHQ QUESTIONS 1-9: 0
1. LITTLE INTEREST OR PLEASURE IN DOING THINGS: 0
SUM OF ALL RESPONSES TO PHQ QUESTIONS 1-9: 0
2. FEELING DOWN, DEPRESSED OR HOPELESS: 0
SUM OF ALL RESPONSES TO PHQ QUESTIONS 1-9: 0

## 2022-08-15 ASSESSMENT — ENCOUNTER SYMPTOMS
SHORTNESS OF BREATH: 0
COUGH: 0
VOMITING: 0
NAUSEA: 0
EYE DISCHARGE: 0
ABDOMINAL PAIN: 0
BLOOD IN STOOL: 0
EYE PAIN: 0
WHEEZING: 0
SINUS PAIN: 0
CHEST TIGHTNESS: 0
SORE THROAT: 0
RHINORRHEA: 0
SINUS PRESSURE: 0
TROUBLE SWALLOWING: 0

## 2022-08-15 NOTE — PATIENT INSTRUCTIONS
Blood work and urine test this Wednesday. Hypertension increase metoprolol to 100 mg with dinner as heart rate is still in 80s and blood pressure still staying high. Extensive counseling done to keep low sodium diet and  Avoid potato chips, pretzels, sauerkraut , ham , sausage, koenig , salty crackers , salty french fries, salty nuts, salty popcorn etc.  Use only low sodium soups. No salted canned vegetables. Use fresh or frozen vegetables. No salt shaker use. May use Mrs. Louis as a salt substitute. Avoid weight gain. Regular exercise program.  Keep taking blood pressure medications regularly. If blood pressure staying above 130/85 or having side effects with blood pressure medications, then bring the blood pressure and pulse recorded twice a day to an appointment sooner than scheduled one. Keep trying to lose weight. Regular exercise and diet. Discussed use, benefit, and side effects of prescribed medications. Barriers to compliance discussed. All patient questions answered. Pt voiced understanding. IF YOU NEED A PRESCRIPTION REFILL, THEN PLEASE GIVE US THREE WORKING DAYS TO REFILL A PRESCRIPTION. May go to urgent care or Emergency room or call to be seen in the office sooner than scheduled follow-up appointment,if condition worsens.

## 2022-08-15 NOTE — PROGRESS NOTES
Kim Meeks (:  1985) is a Established patient, here for evaluation of the following:    Assessment & Plan   Below is the assessment and plan developed based on review of pertinent history, physical exam, labs, studies, and medications. 1. Primary hypertension  -     amLODIPine (NORVASC) 10 MG tablet; Take 1 tablet by mouth in the morning., Disp-30 tablet, R-0Normal  -     hydroCHLOROthiazide (MICROZIDE) 12.5 MG capsule; Take 1 capsule by mouth every morning, Disp-30 capsule, R-0Normal  -     metoprolol succinate (TOPROL XL) 100 MG extended release tablet; Take 1 tablet by mouth Daily with supper, Disp-30 tablet, R-0Normal    Return in about 4 weeks (around 2022) for blood pressure. Blood work and urine test this Wednesday. Hypertension increase metoprolol to 100 mg with dinner as heart rate is still in 80s and blood pressure still staying high. Extensive counseling done to keep low sodium diet and  Avoid potato chips, pretzels, sauerkraut , ham , sausage, koenig , salty crackers , salty french fries, salty nuts, salty popcorn etc.  Use only low sodium soups. No salted canned vegetables. Use fresh or frozen vegetables. No salt shaker use. May use Mrs. Louis as a salt substitute. Avoid weight gain. Regular exercise program.  Keep taking blood pressure medications regularly. If blood pressure staying above 130/85 or having side effects with blood pressure medications, then bring the blood pressure and pulse recorded twice a day to an appointment sooner than scheduled one. Keep trying to lose weight. Regular exercise and diet. Discussed use, benefit, and side effects of prescribed medications. Barriers to compliance discussed. All patient questions answered. Pt voiced understanding. IF YOU NEED A PRESCRIPTION REFILL, THEN PLEASE GIVE US THREE WORKING DAYS TO REFILL A PRESCRIPTION.   May go to urgent care or Emergency room or call to be seen in the office sooner than scheduled follow-up appointment,if condition worsens. Subjective   He is supposed to do the blood work and he said he can do that on Wednesday morning so explained to him he has urine specimen also to be checked for protein in the urine so he understood that. Hypertension    Watching low-sodium diet. 130/90--upto 150/100  Taking blood pressure medications regularly. Blood pressure checked off and on and trying to keep a goal of blood pressure less than 130/85 most of the time. Denies any chest pain / palpitation / shortness of breath / lightheadedness etc.   The available labs reviewed and analyzed and independent interpretation of the results explained at length. Lab Results       Component                Value               Date/Time                  NA                       139                 05/01/2022 02:34 PM        K                        3.7                 05/01/2022 02:34 PM        CL                       99                  05/01/2022 02:34 PM        CO2                      28                  05/01/2022 02:34 PM        BUN                      12                  05/01/2022 02:34 PM        CREATININE               1.4                 05/01/2022 02:34 PM        GLUCOSE                  84                  05/01/2022 02:34 PM        CALCIUM                  9.6                 05/01/2022 02:34 PM       He has been exercising and trying to lose weight. Review of Systems   Constitutional:  Negative for appetite change, chills, fever and unexpected weight change. HENT:  Negative for congestion, ear discharge, ear pain, nosebleeds, rhinorrhea, sinus pressure, sinus pain, sore throat and trouble swallowing. Eyes:  Negative for pain and discharge. Respiratory:  Negative for cough, chest tightness, shortness of breath and wheezing. Cardiovascular:  Negative for chest pain, palpitations and leg swelling. Gastrointestinal:  Negative for abdominal pain, blood in stool, nausea and vomiting. Endocrine: Negative for polydipsia and polyphagia. Genitourinary:  Negative for difficulty urinating, enuresis, flank pain and hematuria. Musculoskeletal:  Negative for myalgias. Skin:  Negative for rash. Neurological:  Negative for facial asymmetry, weakness, light-headedness, numbness and headaches. Psychiatric/Behavioral:  Negative for confusion.          Objective   Patient-Reported Vitals  Patient-Reported Systolic (Top): 662 mmHg (friday reading)  Patient-Reported Diastolic (Bottom): 86 mmHg  Patient-Reported Weight: 383lb  Patient-Reported Height: 5 '9\"       Physical Exam  [INSTRUCTIONS:  \"[x]\" Indicates a positive item  \"[]\" Indicates a negative item  -- DELETE ALL ITEMS NOT EXAMINED]    Constitutional: [x] Appears well-developed and well-nourished [x] No apparent distress      [] Abnormal -     Mental status: [x] Alert and awake  [x] Oriented to person/place/time [x] Able to follow commands    [] Abnormal -     Eyes:   EOM    [x]  Normal    [] Abnormal -   Sclera  [x]  Normal    [] Abnormal -          Discharge [x]  None visible   [] Abnormal -     HENT: [x] Normocephalic, atraumatic  [] Abnormal -   [x] Mouth/Throat: Mucous membranes are moist    External Ears [x] Normal  [] Abnormal -    Neck: [x] No visualized mass [] Abnormal -     Pulmonary/Chest: [x] Respiratory effort normal   [x] No visualized signs of difficulty breathing or respiratory distress        [] Abnormal -      Musculoskeletal:   [x] Normal gait with no signs of ataxia         [x] Normal range of motion of neck        [] Abnormal -     Neurological:        [x] No Facial Asymmetry (Cranial nerve 7 motor function) (limited exam due to video visit)          [x] No gaze palsy        [] Abnormal -          Skin:        [x] No significant exanthematous lesions or discoloration noted on facial skin         [] Abnormal -            Psychiatric:       [x] Normal Affect [] Abnormal -        [x] No Hallucinations    Other pertinent observable physical exam findings:-         On this date 8/15/2022 I have spent 25 minutes reviewing previous notes, test results and face to face (virtual) with the patient discussing the diagnosis and importance of compliance with the treatment plan as well as documenting on the day of the visit. Alejandro Ester, was evaluated through a synchronous (real-time) audio-video encounter. The patient (or guardian if applicable) is aware that this is a billable service, which includes applicable co-pays. This Virtual Visit was conducted with patient's (and/or legal guardian's) consent. The visit was conducted pursuant to the emergency declaration under the [address] [place] [address], [address] [place] [address] authority and the [employer] Act. Patient identification was verified, and a caregiver was present when appropriate. The patient was located at Other: close to home .    Provider was located at Home ([address]): Braulio Saini MD

## 2022-08-18 DIAGNOSIS — I10 ACCELERATED HYPERTENSION: ICD-10-CM

## 2022-08-18 DIAGNOSIS — E66.01 CLASS 3 SEVERE OBESITY DUE TO EXCESS CALORIES WITH SERIOUS COMORBIDITY AND BODY MASS INDEX (BMI) OF 50.0 TO 59.9 IN ADULT (HCC): ICD-10-CM

## 2022-08-18 DIAGNOSIS — I10 PRIMARY HYPERTENSION: ICD-10-CM

## 2022-08-18 LAB
ANION GAP SERPL CALCULATED.3IONS-SCNC: 12 MMOL/L (ref 3–16)
BACTERIA: NORMAL /HPF
BILIRUBIN URINE: NEGATIVE
BLOOD, URINE: NEGATIVE
BUN BLDV-MCNC: 18 MG/DL (ref 7–20)
CALCIUM SERPL-MCNC: 9.4 MG/DL (ref 8.3–10.6)
CHLORIDE BLD-SCNC: 99 MMOL/L (ref 99–110)
CLARITY: CLEAR
CO2: 31 MMOL/L (ref 21–32)
COLOR: YELLOW
CREAT SERPL-MCNC: 1.2 MG/DL (ref 0.9–1.3)
CREATININE URINE: 106.5 MG/DL (ref 39–259)
CREATININE URINE: 109.4 MG/DL (ref 39–259)
EPITHELIAL CELLS, UA: 0 /HPF (ref 0–5)
GFR AFRICAN AMERICAN: >60
GFR NON-AFRICAN AMERICAN: >60
GLUCOSE BLD-MCNC: 97 MG/DL (ref 70–99)
GLUCOSE URINE: NEGATIVE MG/DL
HYALINE CASTS: 0 /LPF (ref 0–8)
KETONES, URINE: NEGATIVE MG/DL
LEUKOCYTE ESTERASE, URINE: NEGATIVE
MICROALBUMIN UR-MCNC: 93.1 MG/DL
MICROALBUMIN/CREAT UR-RTO: 851 MG/G (ref 0–30)
MICROSCOPIC EXAMINATION: YES
NITRITE, URINE: NEGATIVE
PH UA: 7 (ref 5–8)
POTASSIUM SERPL-SCNC: 4.2 MMOL/L (ref 3.5–5.1)
PROTEIN PROTEIN: 142 MG/DL
PROTEIN UA: 300 MG/DL
PROTEIN/CREAT RATIO: 1.3 MG/DL
RBC UA: 1 /HPF (ref 0–4)
SODIUM BLD-SCNC: 142 MMOL/L (ref 136–145)
SPECIFIC GRAVITY UA: 1.02 (ref 1–1.03)
T4 FREE: 1.2 NG/DL (ref 0.9–1.8)
TSH REFLEX: 2.02 UIU/ML (ref 0.27–4.2)
URINE REFLEX TO CULTURE: NORMAL
URINE TYPE: NORMAL
UROBILINOGEN, URINE: 0.2 E.U./DL
WBC UA: 1 /HPF (ref 0–5)

## 2022-08-21 DIAGNOSIS — R80.1 PERSISTENT PROTEINURIA: Primary | ICD-10-CM

## 2022-08-21 DIAGNOSIS — I10 PRIMARY HYPERTENSION: ICD-10-CM

## 2022-09-19 DIAGNOSIS — I10 PRIMARY HYPERTENSION: ICD-10-CM

## 2022-09-20 RX ORDER — HYDROCHLOROTHIAZIDE 12.5 MG/1
12.5 CAPSULE, GELATIN COATED ORAL EVERY MORNING
Qty: 90 CAPSULE | Refills: 0 | Status: SHIPPED | OUTPATIENT
Start: 2022-09-20 | End: 2022-10-06 | Stop reason: ALTCHOICE

## 2022-09-20 RX ORDER — METOPROLOL SUCCINATE 100 MG/1
100 TABLET, EXTENDED RELEASE ORAL
Qty: 90 TABLET | Refills: 0 | Status: SHIPPED | OUTPATIENT
Start: 2022-09-20

## 2022-09-20 NOTE — TELEPHONE ENCOUNTER
He was supposed to be seen in the office in September 12-he needs to reschedule his appointment for this week or next week.

## 2022-09-28 DIAGNOSIS — I10 PRIMARY HYPERTENSION: ICD-10-CM

## 2022-09-28 LAB
ALBUMIN SERPL-MCNC: 4.4 G/DL (ref 3.4–5)
ANION GAP SERPL CALCULATED.3IONS-SCNC: 13 MMOL/L (ref 3–16)
BUN BLDV-MCNC: 15 MG/DL (ref 7–20)
CALCIUM SERPL-MCNC: 9.8 MG/DL (ref 8.3–10.6)
CHLORIDE BLD-SCNC: 100 MMOL/L (ref 99–110)
CO2: 29 MMOL/L (ref 21–32)
CREAT SERPL-MCNC: 1.1 MG/DL (ref 0.9–1.3)
CREATININE URINE: 139.2 MG/DL (ref 39–259)
GFR AFRICAN AMERICAN: >60
GFR NON-AFRICAN AMERICAN: >60
GLUCOSE BLD-MCNC: 99 MG/DL (ref 70–99)
HAV IGM SER IA-ACNC: NORMAL
HEPATITIS B CORE IGM ANTIBODY: NORMAL
HEPATITIS B SURFACE ANTIGEN INTERPRETATION: NORMAL
HEPATITIS C ANTIBODY INTERPRETATION: NORMAL
MICROALBUMIN UR-MCNC: 96.9 MG/DL
MICROALBUMIN/CREAT UR-RTO: 696.1 MG/G (ref 0–30)
PHOSPHORUS: 3.1 MG/DL (ref 2.5–4.9)
POTASSIUM SERPL-SCNC: 3.9 MMOL/L (ref 3.5–5.1)
SODIUM BLD-SCNC: 142 MMOL/L (ref 136–145)

## 2022-09-30 LAB
KAPPA, FREE LIGHT CHAINS, SERUM: 26.17 MG/L (ref 3.3–19.4)
KAPPA/LAMBDA RATIO: 1.34 (ref 0.26–1.65)
KAPPA/LAMBDA TEST COMMENT: ABNORMAL
LAMBDA, FREE LIGHT CHAINS, SERUM: 19.47 MG/L (ref 5.71–26.3)

## 2022-10-05 DIAGNOSIS — I10 PRIMARY HYPERTENSION: ICD-10-CM

## 2022-10-05 LAB
ALDOSTERONE/RENIN ACTIVITY CALCULATION: 112.5 RATIO
ALDOSTERONE: 22.5 NG/DL
RENIN ACTIVITY: 0.2 NG/ML/HR

## 2022-10-07 RX ORDER — AMLODIPINE BESYLATE 10 MG/1
10 TABLET ORAL DAILY
Qty: 30 TABLET | Refills: 0 | Status: SHIPPED | OUTPATIENT
Start: 2022-10-07 | End: 2022-10-10 | Stop reason: SDUPTHER

## 2022-10-10 ENCOUNTER — OFFICE VISIT (OUTPATIENT)
Dept: INTERNAL MEDICINE CLINIC | Age: 37
End: 2022-10-10
Payer: COMMERCIAL

## 2022-10-10 VITALS
SYSTOLIC BLOOD PRESSURE: 190 MMHG | WEIGHT: 315 LBS | HEIGHT: 69 IN | DIASTOLIC BLOOD PRESSURE: 124 MMHG | OXYGEN SATURATION: 99 % | BODY MASS INDEX: 46.65 KG/M2 | TEMPERATURE: 97.5 F | HEART RATE: 86 BPM

## 2022-10-10 DIAGNOSIS — I15.2 HYPERTENSION DUE TO ENDOCRINE DISORDER: Primary | ICD-10-CM

## 2022-10-10 DIAGNOSIS — E66.01 CLASS 3 SEVERE OBESITY DUE TO EXCESS CALORIES WITH SERIOUS COMORBIDITY AND BODY MASS INDEX (BMI) OF 50.0 TO 59.9 IN ADULT (HCC): ICD-10-CM

## 2022-10-10 DIAGNOSIS — N18.31 CKD STAGE G3A/A1, GFR 45-59 AND ALBUMIN CREATININE RATIO <30 MG/G (HCC): ICD-10-CM

## 2022-10-10 DIAGNOSIS — E26.9 HYPERALDOSTERONISM (HCC): ICD-10-CM

## 2022-10-10 PROCEDURE — 99214 OFFICE O/P EST MOD 30 MIN: CPT | Performed by: INTERNAL MEDICINE

## 2022-10-10 RX ORDER — TERAZOSIN 5 MG/1
5 CAPSULE ORAL NIGHTLY
Qty: 30 CAPSULE | Refills: 0 | Status: SHIPPED | OUTPATIENT
Start: 2022-10-10

## 2022-10-10 RX ORDER — AMLODIPINE BESYLATE 10 MG/1
10 TABLET ORAL DAILY
Qty: 90 TABLET | Refills: 0 | Status: SHIPPED | OUTPATIENT
Start: 2022-10-10

## 2022-10-10 ASSESSMENT — ENCOUNTER SYMPTOMS
ABDOMINAL PAIN: 0
RHINORRHEA: 0
CHEST TIGHTNESS: 0
SINUS PRESSURE: 0
SORE THROAT: 0
EYE PAIN: 0
SINUS PAIN: 0
VOMITING: 0
TROUBLE SWALLOWING: 0
NAUSEA: 0
BLOOD IN STOOL: 0
EYE DISCHARGE: 0
SHORTNESS OF BREATH: 0
WHEEZING: 0
COUGH: 0

## 2022-10-10 ASSESSMENT — PATIENT HEALTH QUESTIONNAIRE - PHQ9
1. LITTLE INTEREST OR PLEASURE IN DOING THINGS: 0
SUM OF ALL RESPONSES TO PHQ QUESTIONS 1-9: 0
SUM OF ALL RESPONSES TO PHQ9 QUESTIONS 1 & 2: 0
SUM OF ALL RESPONSES TO PHQ QUESTIONS 1-9: 0
SUM OF ALL RESPONSES TO PHQ QUESTIONS 1-9: 0
2. FEELING DOWN, DEPRESSED OR HOPELESS: 0
SUM OF ALL RESPONSES TO PHQ QUESTIONS 1-9: 0

## 2022-10-10 NOTE — PROGRESS NOTES
10/10/2022     Alec Kawasaki (: 1985) is a 40 y.o. male, here for evaluation of the following medical concerns:    Chief Complaint   Patient presents with    Hypertension        Hypertension    Watching low-sodium diet. Blood pressure at home still running 150s to 125C and 45B diastolic to 797B and heart rate is around 89. His aldosterone level is elevated  causing blood pressure elevated and spironolactone added and he started this on Friday sent by nephrologist and blood pressure still elevated and went to add terazosin 5 mg bedtime and do the CT adrenal study and I do see CT scan from 2019 on abdomen pelvis unremarkable. Taking blood pressure medications regularly. Blood pressure checked off and on and trying to keep a goal of blood pressure less than 130/85 most of the time. Denies any chest pain / palpitation / shortness of breath / lightheadedness etc.   The available labs reviewed and analyzed and independent interpretation of the results explained at length. Lab Results       Component                Value               Date/Time                  NA                       142                 2022 10:00 AM        K                        3.9                 2022 10:00 AM        K                        3.7                 2022 02:34 PM        CL                       100                 2022 10:00 AM        CO2                      29                  2022 10:00 AM        BUN                      15                  2022 10:00 AM        CREATININE               1.1                 2022 10:00 AM        GLUCOSE                  99                  2022 10:00 AM        CALCIUM                  9.8                 2022 10:00 AM           Review of Systems   Constitutional:  Negative for appetite change, chills, fever and unexpected weight change.    HENT:  Negative for congestion, ear discharge, ear pain, nosebleeds, rhinorrhea, sinus pressure, sinus pain, sore throat and trouble swallowing. Eyes:  Negative for pain and discharge. Respiratory:  Negative for cough, chest tightness, shortness of breath and wheezing. Cardiovascular:  Negative for chest pain, palpitations and leg swelling. Gastrointestinal:  Negative for abdominal pain, blood in stool, nausea and vomiting. Endocrine: Negative for polydipsia and polyphagia. Genitourinary:  Negative for difficulty urinating, enuresis, flank pain and hematuria. Musculoskeletal:  Negative for myalgias. Skin:  Negative for rash. Neurological:  Negative for facial asymmetry, weakness, light-headedness, numbness and headaches. Psychiatric/Behavioral:  Negative for confusion. Current Outpatient Medications on File Prior to Visit   Medication Sig Dispense Refill    spironolactone (ALDACTONE) 50 MG tablet Take 1 tablet by mouth daily 30 tablet 3    metoprolol succinate (TOPROL XL) 100 MG extended release tablet TAKE 1 TABLET BY MOUTH DAILY WITH SUPPER 90 tablet 0     No current facility-administered medications on file prior to visit.       Past Medical History:   Diagnosis Date    Diverticulitis     3 bout since 2009    Essential hypertension       Social History     Tobacco Use    Smoking status: Some Days     Types: Cigars    Smokeless tobacco: Never    Tobacco comments:     once a week   Substance Use Topics    Alcohol use: Yes     Comment: socially      Family History   Problem Relation Age of Onset    Hypertension Mother     Stroke Mother     Hypertension Father         Vitals:    10/10/22 0803 10/10/22 0810   BP: (!) 200/120 (!) 190/124   Site: Left Upper Arm Left Upper Arm   Position: Sitting Sitting   Cuff Size: Large Adult Large Adult   Pulse: 86    Temp: 97.5 °F (36.4 °C)    TempSrc: Temporal    SpO2: 99%    Weight: (!) 380 lb (172.4 kg)    Height: 5' 9\" (1.753 m)      Estimated body mass index is 56.12 kg/m² as calculated from the following:    Height as of this encounter: 5' 9\" (1.753 m). Weight as of this encounter: 380 lb (172.4 kg). Physical Exam  Vitals and nursing note reviewed. Constitutional:       General: He is not in acute distress. HENT:      Head: Normocephalic and atraumatic. Right Ear: External ear normal.      Left Ear: External ear normal.   Eyes:      General: Lids are normal.      Conjunctiva/sclera: Conjunctivae normal.      Pupils: Pupils are equal, round, and reactive to light. Neck:      Thyroid: No thyromegaly. Vascular: No JVD. Trachea: No tracheal deviation. Cardiovascular:      Rate and Rhythm: Normal rate and regular rhythm. Heart sounds: Normal heart sounds. No gallop. Pulmonary:      Effort: Pulmonary effort is normal. No respiratory distress. Breath sounds: Normal breath sounds. No wheezing or rales. Abdominal:      General: Bowel sounds are normal.      Palpations: Abdomen is soft. There is no mass. Tenderness: There is no abdominal tenderness. Musculoskeletal:         General: No tenderness. Cervical back: Neck supple. Comments: No leg edema or calf tenderness   Lymphadenopathy:      Cervical: No cervical adenopathy. Skin:     General: Skin is warm and dry. Findings: No rash. Neurological:      Mental Status: He is alert and oriented to person, place, and time. Cranial Nerves: No cranial nerve deficit. Sensory: No sensory deficit. Psychiatric:         Behavior: Behavior normal.         Thought Content: Thought content normal.       ASSESSMENT/PLAN:  1. Hypertension due to endocrine disorder    - terazosin (HYTRIN) 5 MG capsule; Take 1 capsule by mouth nightly  Dispense: 30 capsule; Refill: 0  - CT ADRENALS W WO CONTRAST; Future    2. Hyperaldosteronism ([address]    - CT ADRENALS W WO CONTRAST; Future    3. Class 3 severe obesity due to excess calories with serious comorbidity and body mass index (BMI) of 50.0 to 59.9 in adult (HCC)  Lose wt    4.  CKD stage G3a/A1, GFR 45-59 and albumin creatinine ratio <30 mg/g (HCC)    - CT ADRENALS W WO CONTRAST; Future    Return in about 3 weeks (around 10/31/2022) for blood pressure. Patient Instructions   Call [phone] to schedule CT adrenal gland. Add terazosin to other 3 blood pressure medication 5 mg at bedtime    Hypertension    Extensive counseling done to keep low sodium diet and  Avoid potato chips, pretzels, sauerkraut , ham , sausage, koenig , salty crackers , salty french fries, salty nuts, salty popcorn etc.  Use only low sodium soups. No salted canned vegetables. Use fresh or frozen vegetables. No salt shaker use. May use Mrs. Louis as a salt substitute. Avoid weight gain. Regular exercise program.  Keep taking blood pressure medications regularly. If blood pressure staying above 130/85 or having side effects with blood pressure medications, then bring the blood pressure and pulse recorded twice a day to an appointment sooner than scheduled one. Limit bread potato pasta rice and eat lot of vegetables and lean meats to lose weight. Discussed use, benefit, and side effects of prescribed medications. Barriers to compliance discussed. All patient questions answered. Pt voiced understanding. IF YOU NEED A PRESCRIPTION REFILL, THEN PLEASE GIVE US THREE WORKING DAYS TO REFILL A PRESCRIPTION. May go to urgent care or Emergency room or call to be seen in the office sooner than scheduled follow-up appointment,if condition worsens. Electronically signed by Celeste Garcia MD on 10/10/2022 at 8:35 AM     This dictation was generated by voice recognition computer software. Although all attempts are made to edit the dictation for accuracy, there may be errors in the transcription that are not intended.

## 2022-10-10 NOTE — PATIENT INSTRUCTIONS
Call [phone] to schedule CT adrenal gland. Add terazosin to other 3 blood pressure medication 5 mg at bedtime    Hypertension    Extensive counseling done to keep low sodium diet and  Avoid potato chips, pretzels, sauerkraut , ham , sausage, koenig , salty crackers , salty french fries, salty nuts, salty popcorn etc.  Use only low sodium soups. No salted canned vegetables. Use fresh or frozen vegetables. No salt shaker use. May use Mrs. Louis as a salt substitute. Avoid weight gain. Regular exercise program.  Keep taking blood pressure medications regularly. If blood pressure staying above 130/85 or having side effects with blood pressure medications, then bring the blood pressure and pulse recorded twice a day to an appointment sooner than scheduled one. Limit bread potato pasta rice and eat lot of vegetables and lean meats to lose weight. Discussed use, benefit, and side effects of prescribed medications. Barriers to compliance discussed. All patient questions answered. Pt voiced understanding. IF YOU NEED A PRESCRIPTION REFILL, THEN PLEASE GIVE US THREE WORKING DAYS TO REFILL A PRESCRIPTION. May go to urgent care or Emergency room or call to be seen in the office sooner than scheduled follow-up appointment,if condition worsens.

## 2022-10-24 ENCOUNTER — HOSPITAL ENCOUNTER (OUTPATIENT)
Dept: CT IMAGING | Age: 37
Discharge: HOME | End: 2022-10-24
Payer: COMMERCIAL

## 2022-10-24 DIAGNOSIS — E26.9 HYPERALDOSTERONISM (HCC): ICD-10-CM

## 2022-10-24 DIAGNOSIS — N18.31 CKD STAGE G3A/A1, GFR 45-59 AND ALBUMIN CREATININE RATIO <30 MG/G (HCC): ICD-10-CM

## 2022-10-24 DIAGNOSIS — I15.2 HYPERTENSION DUE TO ENDOCRINE DISORDER: ICD-10-CM

## 2022-10-24 PROCEDURE — 6360000004 HC RX CONTRAST MEDICATION: Performed by: INTERNAL MEDICINE

## 2022-10-24 PROCEDURE — 74170 CT ABD WO CNTRST FLWD CNTRST: CPT

## 2022-10-24 RX ADMIN — IOPAMIDOL 75 ML: 755 INJECTION, SOLUTION INTRAVENOUS at 18:38

## 2022-10-26 ENCOUNTER — TELEPHONE (OUTPATIENT)
Dept: INTERNAL MEDICINE CLINIC | Age: 37
End: 2022-10-26

## 2022-10-26 DIAGNOSIS — D35.02 ADRENAL ADENOMA, LEFT: Primary | ICD-10-CM

## 2022-10-26 DIAGNOSIS — I15.8 OTHER SECONDARY HYPERTENSION: ICD-10-CM

## 2022-10-26 DIAGNOSIS — E26.9 HYPERALDOSTERONISM (HCC): ICD-10-CM

## 2022-10-26 NOTE — TELEPHONE ENCOUNTER
Neva Clarke MD   10/26/2022  8:52 AM EDT       His CT scan shows left adrenal gland enlarged and need 24-hour urine testing done with 24-hour urine collection and he needs to get a check from the lab and he needs to collect the urine for 24 hours and then check and get back to the lab the next day. Referral done for endocrinologist Dr. Robert Kaur and get him an appointment soon. Patient advised per Dr. Mary Mclaughlin. First available is January 2023, per Dr. Mary Mclaughlin ok to schedule then. Patient advised and scheduled for 1/9 /23 @ 3 pm & follow-up 2/6/23 @ 8:40 am with Dr. Robert Kaur. Office will mail out a new patient packet to patient's home.

## 2022-10-26 NOTE — TELEPHONE ENCOUNTER
Patient returning call to [facility] regarding CT results    Pls call and advise.

## 2022-11-07 ENCOUNTER — OFFICE VISIT (OUTPATIENT)
Dept: INTERNAL MEDICINE CLINIC | Age: 37
End: 2022-11-07
Payer: COMMERCIAL

## 2022-11-07 VITALS
SYSTOLIC BLOOD PRESSURE: 140 MMHG | BODY MASS INDEX: 46.65 KG/M2 | HEIGHT: 69 IN | OXYGEN SATURATION: 99 % | HEART RATE: 86 BPM | WEIGHT: 315 LBS | TEMPERATURE: 97.3 F | DIASTOLIC BLOOD PRESSURE: 110 MMHG

## 2022-11-07 DIAGNOSIS — I15.8 OTHER SECONDARY HYPERTENSION: ICD-10-CM

## 2022-11-07 DIAGNOSIS — E66.01 CLASS 3 SEVERE OBESITY DUE TO EXCESS CALORIES WITH SERIOUS COMORBIDITY AND BODY MASS INDEX (BMI) OF 50.0 TO 59.9 IN ADULT (HCC): ICD-10-CM

## 2022-11-07 DIAGNOSIS — I15.2 HYPERTENSION DUE TO ENDOCRINE DISORDER: Primary | ICD-10-CM

## 2022-11-07 DIAGNOSIS — I15.2 HYPERTENSION DUE TO ENDOCRINE DISORDER: ICD-10-CM

## 2022-11-07 DIAGNOSIS — N18.2 CKD (CHRONIC KIDNEY DISEASE), STAGE II: ICD-10-CM

## 2022-11-07 DIAGNOSIS — D35.02 ADRENAL ADENOMA, LEFT: ICD-10-CM

## 2022-11-07 LAB
ANION GAP SERPL CALCULATED.3IONS-SCNC: 10 MMOL/L (ref 3–16)
BUN BLDV-MCNC: 18 MG/DL (ref 7–20)
CALCIUM SERPL-MCNC: 10.2 MG/DL (ref 8.3–10.6)
CHLORIDE BLD-SCNC: 96 MMOL/L (ref 99–110)
CO2: 29 MMOL/L (ref 21–32)
CREAT SERPL-MCNC: 1.2 MG/DL (ref 0.9–1.3)
CREATININE URINE: 203.3 MG/DL (ref 39–259)
GFR SERPL CREATININE-BSD FRML MDRD: >60 ML/MIN/{1.73_M2}
GLUCOSE BLD-MCNC: 85 MG/DL (ref 70–99)
POTASSIUM SERPL-SCNC: 4.9 MMOL/L (ref 3.5–5.1)
PROTEIN PROTEIN: 100 MG/DL
PROTEIN/CREAT RATIO: 0.5 MG/DL
SODIUM BLD-SCNC: 135 MMOL/L (ref 136–145)

## 2022-11-07 PROCEDURE — 99214 OFFICE O/P EST MOD 30 MIN: CPT | Performed by: INTERNAL MEDICINE

## 2022-11-07 PROCEDURE — 3074F SYST BP LT 130 MM HG: CPT | Performed by: INTERNAL MEDICINE

## 2022-11-07 PROCEDURE — 3078F DIAST BP <80 MM HG: CPT | Performed by: INTERNAL MEDICINE

## 2022-11-07 RX ORDER — TERAZOSIN 5 MG/1
5 CAPSULE ORAL NIGHTLY
Qty: 90 CAPSULE | Refills: 0 | Status: SHIPPED | OUTPATIENT
Start: 2022-11-07

## 2022-11-07 RX ORDER — METOPROLOL SUCCINATE 200 MG/1
200 TABLET, EXTENDED RELEASE ORAL
Qty: 30 TABLET | Refills: 1 | Status: SHIPPED | OUTPATIENT
Start: 2022-11-07

## 2022-11-07 RX ORDER — METOPROLOL SUCCINATE 200 MG/1
200 TABLET, EXTENDED RELEASE ORAL
Qty: 30 TABLET | Refills: 1 | Status: SHIPPED | OUTPATIENT
Start: 2022-11-07 | End: 2022-11-07

## 2022-11-07 ASSESSMENT — ENCOUNTER SYMPTOMS
EYE DISCHARGE: 0
CHEST TIGHTNESS: 0
SINUS PRESSURE: 0
VOMITING: 0
NAUSEA: 0
SORE THROAT: 0
ABDOMINAL PAIN: 0
BLOOD IN STOOL: 0
TROUBLE SWALLOWING: 0
EYE PAIN: 0
WHEEZING: 0
RHINORRHEA: 0
COUGH: 0
SINUS PAIN: 0
SHORTNESS OF BREATH: 0

## 2022-11-07 ASSESSMENT — PATIENT HEALTH QUESTIONNAIRE - PHQ9
2. FEELING DOWN, DEPRESSED OR HOPELESS: 0
SUM OF ALL RESPONSES TO PHQ QUESTIONS 1-9: 0
SUM OF ALL RESPONSES TO PHQ9 QUESTIONS 1 & 2: 0
1. LITTLE INTEREST OR PLEASURE IN DOING THINGS: 0

## 2022-11-07 NOTE — PATIENT INSTRUCTIONS
Hypertension increase metoprolol to 200 mg what he has at home he can take 2 with supper and see how his heart rate does and make sure it always stays more than 50. Right now and then we decide on spironolactone otherwise all other blood pressure medication amlodipine and terazosin stays the same. Extensive counseling done to keep low sodium diet and  Avoid potato chips, pretzels, sauerkraut , ham , sausage, koenig , salty crackers , salty french fries, salty nuts, salty popcorn etc.  Use only low sodium soups. No salted canned vegetables. Use fresh or frozen vegetables. No salt shaker use. May use Mrs. Louis as a salt substitute. Avoid weight gain. Regular exercise program.  Keep taking blood pressure medications regularly. If blood pressure staying above 130/85 or having side effects with blood pressure medications, then bring the blood pressure and pulse recorded twice a day to an appointment sooner than scheduled one. To quit his juices and sweet tea and needs to work on eating more lean meats or egg whites or more protein and lot of vegetables and lot of water drinking or propel or vitamin water or other flavors. Help him lose weight to help the blood pressure. No Tea coffee pop or chocolate. Discussed use, benefit, and side effects of prescribed medications. Barriers to compliance discussed. All patient questions answered. Pt voiced understanding. IF YOU NEED A PRESCRIPTION REFILL, THEN PLEASE GIVE US THREE WORKING DAYS TO REFILL A PRESCRIPTION. May go to urgent care or Emergency room or call to be seen in the office sooner than scheduled follow-up appointment,if condition worsens. Please get all OVER DUE VACCINATIONS done at the pharmacy or health department as soon as possible.

## 2022-11-07 NOTE — PROGRESS NOTES
2022     Eileen Enciso (: 1985) is a 40 y.o. male, here for evaluation of the following medical concerns:    Chief Complaint   Patient presents with    Follow-up     3 week    Medication Refill     Amlodipine and Spironolactone        Hypertension    Watching low-sodium diet. SGOT the jug for the urine for the catecholamines VMA etc. and he will be doing that this week and then we know more about hormones . He is going to be seeing endocrinologist in January. Blood pressure is getting better at home than here he has been running 171/105 and 168/99 and 157/91 pulse of 92 170/110 pulse of 96 and 162/96 pulse of 96  Taking blood pressure medications regularly. Blood pressure checked off and on and trying to keep a goal of blood pressure less than 130/85 most of the time. Denies any chest pain / palpitation / shortness of breath / lightheadedness etc.   The available labs reviewed and analyzed and independent interpretation of the results explained at length. Lab Results       Component                Value               Date/Time                  NA                       142                 2022 10:00 AM        K                        3.9                 2022 10:00 AM        K                        3.7                 2022 02:34 PM        CL                       100                 2022 10:00 AM        CO2                      29                  2022 10:00 AM        BUN                      15                  2022 10:00 AM        CREATININE               1.1                 2022 10:00 AM        GLUCOSE                  99                  2022 10:00 AM        CALCIUM                  9.8                 2022 10:00 AM           Review of Systems   Constitutional:  Negative for appetite change, chills, fever and unexpected weight change.    HENT:  Negative for congestion, ear discharge, ear pain, nosebleeds, rhinorrhea, sinus pressure, sinus pain, sore throat and trouble swallowing. Eyes:  Negative for pain and discharge. Respiratory:  Negative for cough, chest tightness, shortness of breath and wheezing. Cardiovascular:  Negative for chest pain, palpitations and leg swelling. Gastrointestinal:  Negative for abdominal pain, blood in stool, nausea and vomiting. Endocrine: Negative for polydipsia and polyphagia. Genitourinary:  Negative for difficulty urinating, enuresis, flank pain and hematuria. Musculoskeletal:  Negative for myalgias. Skin:  Negative for rash. Neurological:  Negative for facial asymmetry, weakness, light-headedness, numbness and headaches. Psychiatric/Behavioral:  Negative for confusion. Current Outpatient Medications on File Prior to Visit   Medication Sig Dispense Refill    amLODIPine (NORVASC) 10 MG tablet Take 1 tablet by mouth daily 90 tablet 0    spironolactone (ALDACTONE) 50 MG tablet Take 1 tablet by mouth daily 30 tablet 3     No current facility-administered medications on file prior to visit. Past Medical History:   Diagnosis Date    Diverticulitis     3 bout since 2009    Essential hypertension       Social History     Tobacco Use    Smoking status: Some Days     Types: Cigars    Smokeless tobacco: Never    Tobacco comments:     once a week   Substance Use Topics    Alcohol use: Yes     Comment: socially      Family History   Problem Relation Age of Onset    Hypertension Mother     Stroke Mother     Hypertension Father         Vitals:    11/07/22 0829 11/07/22 0833   BP: (!) 140/120 (!) 140/110   Site: Left Lower Arm    Pulse: 86    Temp: 97.3 °F (36.3 °C)    SpO2: 99%    Weight: (!) 377 lb 12.8 oz (171.4 kg)    Height: 5' 9\" (1.753 m)      Estimated body mass index is 55.79 kg/m² as calculated from the following:    Height as of this encounter: 5' 9\" (1.753 m). Weight as of this encounter: 377 lb 12.8 oz (171.4 kg). Physical Exam  Vitals and nursing note reviewed.    Constitutional: General: He is not in acute distress. HENT:      Head: Normocephalic and atraumatic. Right Ear: External ear normal.      Left Ear: External ear normal.   Eyes:      General: Lids are normal.      Conjunctiva/sclera: Conjunctivae normal.      Pupils: Pupils are equal, round, and reactive to light. Neck:      Thyroid: No thyromegaly. Vascular: No JVD. Trachea: No tracheal deviation. Cardiovascular:      Rate and Rhythm: Normal rate and regular rhythm. Heart sounds: Normal heart sounds. No gallop. Pulmonary:      Effort: Pulmonary effort is normal. No respiratory distress. Breath sounds: Normal breath sounds. No wheezing or rales. Abdominal:      General: Bowel sounds are normal.      Palpations: Abdomen is soft. There is no mass. Tenderness: There is no abdominal tenderness. Musculoskeletal:         General: No tenderness. Cervical back: Neck supple. Comments: No leg edema or calf tenderness   Lymphadenopathy:      Cervical: No cervical adenopathy. Skin:     General: Skin is warm and dry. Findings: No rash. Neurological:      Mental Status: He is alert and oriented to person, place, and time. Cranial Nerves: No cranial nerve deficit. Sensory: No sensory deficit. Psychiatric:         Behavior: Behavior normal.         Thought Content: Thought content normal.       ASSESSMENT/PLAN:  1. Hypertension due to endocrine disorder    - terazosin (HYTRIN) 5 MG capsule; Take 1 capsule by mouth nightly  Dispense: 90 capsule; Refill: 0  - metoprolol succinate (TOPROL XL) 200 MG extended release tablet; Take 1 tablet by mouth Daily with supper  Dispense: 30 tablet; Refill: 1  - Basic Metabolic Panel; Future    2. Class 3 severe obesity due to excess calories with serious comorbidity and body mass index (BMI) of 50.0 to 59.9 in [facility])  Lose wt    Return in about 3 weeks (around 11/28/2022) for blood pressure.      Patient Instructions   Hypertension increase metoprolol to 200 mg what he has at home he can take 2 with supper and see how his heart rate does and make sure it always stays more than 50. Right now and then we decide on spironolactone otherwise all other blood pressure medication amlodipine and terazosin stays the same. Extensive counseling done to keep low sodium diet and  Avoid potato chips, pretzels, sauerkraut , ham , sausage, koenig , salty crackers , salty french fries, salty nuts, salty popcorn etc.  Use only low sodium soups. No salted canned vegetables. Use fresh or frozen vegetables. No salt shaker use. May use Mrs. Louis as a salt substitute. Avoid weight gain. Regular exercise program.  Keep taking blood pressure medications regularly. If blood pressure staying above 130/85 or having side effects with blood pressure medications, then bring the blood pressure and pulse recorded twice a day to an appointment sooner than scheduled one. To quit his juices and sweet tea and needs to work on eating more lean meats or egg whites or more protein and lot of vegetables and lot of water drinking or propel or vitamin water or other flavors. Help him lose weight to help the blood pressure. No Tea coffee pop or chocolate. Discussed use, benefit, and side effects of prescribed medications. Barriers to compliance discussed. All patient questions answered. Pt voiced understanding. IF YOU NEED A PRESCRIPTION REFILL, THEN PLEASE GIVE US THREE WORKING DAYS TO REFILL A PRESCRIPTION. May go to urgent care or Emergency room or call to be seen in the office sooner than scheduled follow-up appointment,if condition worsens. Please get all OVER DUE VACCINATIONS done at the pharmacy or health department as soon as possible. Electronically signed by Cricket Perez MD on 11/7/2022 at 9:14 AM     This dictation was generated by voice recognition computer software.  Although all attempts are made to edit the dictation for accuracy, there may be errors in the transcription that are not intended.

## 2023-01-31 DIAGNOSIS — I15.2 HYPERTENSION DUE TO ENDOCRINE DISORDER: ICD-10-CM

## 2023-01-31 RX ORDER — METOPROLOL SUCCINATE 200 MG/1
200 TABLET, EXTENDED RELEASE ORAL
Qty: 30 TABLET | Refills: 0 | Status: SHIPPED | OUTPATIENT
Start: 2023-01-31

## 2023-01-31 RX ORDER — TERAZOSIN 5 MG/1
5 CAPSULE ORAL NIGHTLY
Qty: 30 CAPSULE | Refills: 0 | Status: SHIPPED | OUTPATIENT
Start: 2023-01-31

## 2023-01-31 RX ORDER — AMLODIPINE BESYLATE 10 MG/1
10 TABLET ORAL DAILY
Qty: 30 TABLET | Refills: 0 | Status: SHIPPED | OUTPATIENT
Start: 2023-01-31 | End: 2023-03-13

## 2023-01-31 RX ORDER — SPIRONOLACTONE 50 MG/1
50 TABLET, FILM COATED ORAL DAILY
Qty: 30 TABLET | Refills: 0 | Status: SHIPPED | OUTPATIENT
Start: 2023-01-31 | End: 2023-03-13

## 2023-01-31 NOTE — TELEPHONE ENCOUNTER
----- Message from [employer] Records sent at 1/31/2023 11:00 AM EST -----  Subject: Refill Request    QUESTIONS  Name of Medication? metoprolol succinate (TOPROL XL) 200 MG extended   release tablet  Patient-reported dosage and instructions? take 1 tablet by mouth daily   with supper  How many days do you have left? 0  Preferred Pharmacy? [facility]  Pharmacy phone number (if available)? [phone]  ---------------------------------------------------------------------------  --------------,  Name of Medication? spironolactone (ALDACTONE) 50 MG tablet  Patient-reported dosage and instructions? 1 tablet by mouth daily     How many days do you have left? 0  [place] [place] [address]  Pharmacy phone number (if available)? [phone]  ---------------------------------------------------------------------------  --------------,  Name of Medication? amLODIPine (NORVASC) 10 MG tablet  Patient-reported dosage and instructions? Take 1 tablet by mouth daily  How many days do you have left? 0  [place] [place] [address]  Pharmacy phone number (if available)? [phone]  ---------------------------------------------------------------------------  --------------,  Name of Medication? terazosin (HYTRIN) 5 MG capsule  Patient-reported dosage and instructions? Take 1 capsule by mouth nightly  How many days do you have left? 0  [facility]? [facility]  Pharmacy phone number (if available)? [phone]  ---------------------------------------------------------------------------  --------------  Kristen GONZALEZ  What is the best way for the office to contact you? OK to leave message on   voicemail  Preferred Call Back Phone Number? [phone]  ---------------------------------------------------------------------------  --------------  SCRIPT ANSWERS  Relationship to Patient?  Self

## 2023-01-31 NOTE — TELEPHONE ENCOUNTER
Last in person visit was 11/07/2022 w/ Dr. Ishmael Corcoran    hospitals appt w/ Dr. Cheikh Carvajal 02/16/2023     Sending to on call physician

## 2023-02-16 ENCOUNTER — OFFICE VISIT (OUTPATIENT)
Dept: INTERNAL MEDICINE CLINIC | Age: 38
End: 2023-02-16
Payer: COMMERCIAL

## 2023-02-16 VITALS
DIASTOLIC BLOOD PRESSURE: 98 MMHG | HEART RATE: 80 BPM | TEMPERATURE: 98.1 F | HEIGHT: 69 IN | BODY MASS INDEX: 46.65 KG/M2 | OXYGEN SATURATION: 97 % | WEIGHT: 315 LBS | SYSTOLIC BLOOD PRESSURE: 160 MMHG

## 2023-02-16 DIAGNOSIS — N18.31 CKD STAGE G3A/A1, GFR 45-59 AND ALBUMIN CREATININE RATIO <30 MG/G (HCC): ICD-10-CM

## 2023-02-16 DIAGNOSIS — E26.9 HYPERALDOSTERONISM (HCC): ICD-10-CM

## 2023-02-16 DIAGNOSIS — E66.01 CLASS 3 SEVERE OBESITY DUE TO EXCESS CALORIES WITH SERIOUS COMORBIDITY AND BODY MASS INDEX (BMI) OF 50.0 TO 59.9 IN ADULT (HCC): ICD-10-CM

## 2023-02-16 DIAGNOSIS — I10 PRIMARY HYPERTENSION: ICD-10-CM

## 2023-02-16 DIAGNOSIS — N18.2 CKD (CHRONIC KIDNEY DISEASE), STAGE II: ICD-10-CM

## 2023-02-16 DIAGNOSIS — I15.2 HYPERTENSION DUE TO ENDOCRINE DISORDER: Primary | ICD-10-CM

## 2023-02-16 LAB
ALBUMIN SERPL-MCNC: 4 G/DL (ref 3.4–5)
ANION GAP SERPL CALCULATED.3IONS-SCNC: 8 MMOL/L (ref 3–16)
BUN BLDV-MCNC: 17 MG/DL (ref 7–20)
CALCIUM SERPL-MCNC: 9.7 MG/DL (ref 8.3–10.6)
CHLORIDE BLD-SCNC: 102 MMOL/L (ref 99–110)
CHOLESTEROL, TOTAL: 214 MG/DL (ref 0–199)
CO2: 30 MMOL/L (ref 21–32)
CREAT SERPL-MCNC: 1.3 MG/DL (ref 0.9–1.3)
CREATININE URINE: 349.4 MG/DL (ref 39–259)
GFR SERPL CREATININE-BSD FRML MDRD: >60 ML/MIN/{1.73_M2}
GLUCOSE BLD-MCNC: 89 MG/DL (ref 70–99)
HDLC SERPL-MCNC: 48 MG/DL (ref 40–60)
LDL CHOLESTEROL CALCULATED: 148 MG/DL
PHOSPHORUS: 3.4 MG/DL (ref 2.5–4.9)
POTASSIUM SERPL-SCNC: 4.7 MMOL/L (ref 3.5–5.1)
PROTEIN PROTEIN: 248 MG/DL
PROTEIN/CREAT RATIO: 0.7 MG/DL
SODIUM BLD-SCNC: 140 MMOL/L (ref 136–145)
TRIGL SERPL-MCNC: 92 MG/DL (ref 0–150)
VLDLC SERPL CALC-MCNC: 18 MG/DL

## 2023-02-16 PROCEDURE — 99213 OFFICE O/P EST LOW 20 MIN: CPT | Performed by: STUDENT IN AN ORGANIZED HEALTH CARE EDUCATION/TRAINING PROGRAM

## 2023-02-16 SDOH — ECONOMIC STABILITY: HOUSING INSECURITY
IN THE LAST 12 MONTHS, WAS THERE A TIME WHEN YOU DID NOT HAVE A STEADY PLACE TO SLEEP OR SLEPT IN A SHELTER (INCLUDING NOW)?: NO

## 2023-02-16 SDOH — ECONOMIC STABILITY: FOOD INSECURITY: WITHIN THE PAST 12 MONTHS, YOU WORRIED THAT YOUR FOOD WOULD RUN OUT BEFORE YOU GOT MONEY TO BUY MORE.: NEVER TRUE

## 2023-02-16 SDOH — ECONOMIC STABILITY: INCOME INSECURITY: HOW HARD IS IT FOR YOU TO PAY FOR THE VERY BASICS LIKE FOOD, HOUSING, MEDICAL CARE, AND HEATING?: NOT HARD AT ALL

## 2023-02-16 SDOH — ECONOMIC STABILITY: FOOD INSECURITY: WITHIN THE PAST 12 MONTHS, THE FOOD YOU BOUGHT JUST DIDN'T LAST AND YOU DIDN'T HAVE MONEY TO GET MORE.: NEVER TRUE

## 2023-02-16 ASSESSMENT — PATIENT HEALTH QUESTIONNAIRE - PHQ9
SUM OF ALL RESPONSES TO PHQ QUESTIONS 1-9: 0
2. FEELING DOWN, DEPRESSED OR HOPELESS: 0
SUM OF ALL RESPONSES TO PHQ QUESTIONS 1-9: 0
SUM OF ALL RESPONSES TO PHQ9 QUESTIONS 1 & 2: 0
1. LITTLE INTEREST OR PLEASURE IN DOING THINGS: 0

## 2023-02-16 ASSESSMENT — ENCOUNTER SYMPTOMS
CONSTIPATION: 0
CHEST TIGHTNESS: 0
CHOKING: 0
COUGH: 0
VOMITING: 0
STRIDOR: 0
ABDOMINAL PAIN: 0
APNEA: 0
DIARRHEA: 0
VOICE CHANGE: 0
SHORTNESS OF BREATH: 0
NAUSEA: 0
WHEEZING: 0
TROUBLE SWALLOWING: 0
PHOTOPHOBIA: 0
ABDOMINAL DISTENTION: 0

## 2023-02-16 NOTE — ASSESSMENT & PLAN NOTE
He has history of accelerated hypertension requiring multiple medications to control his BP. Initial dx of HTN was about 4-5 years ago. In June 2022, he had  renal arterial duplex that did not shows any evidence of renal artery stenosis. Then was found to have hyperaldo state. CT adrenal scan on 10/24/2022 showed evidence of left adrenal adenoma. Sees nephro, Dr. Heather Snell. Pending appointment with endo.    Continue with same meds for now

## 2023-02-16 NOTE — ASSESSMENT & PLAN NOTE
He has history of accelerated hypertension requiring multiple medications to control his BP. Initial dx of HTN was about 4-5 years ago. In June 2022, he had  renal arterial duplex that did not shows any evidence of renal artery stenosis. Then was found to have hyperaldo state. CT adrenal scan on 10/24/2022 showed evidence of left adrenal adenoma. Sees nephroDr. David. Pending appointment with ky.

## 2023-02-16 NOTE — PROGRESS NOTES
Kadi Rivas (:  1985) is a 40 y.o. male,Established patient, here for evaluation of the following chief complaint(s):  Established New Doctor         ASSESSMENT/PLAN:  1. Hypertension due to endocrine disorder  Assessment & Plan:   He has history of accelerated hypertension requiring multiple medications to control his BP. Initial dx of HTN was about 4-5 years ago. In 2022, he had  renal arterial duplex that did not shows any evidence of renal artery stenosis. Then was found to have hyperaldo state. CT adrenal scan on 10/24/2022 showed evidence of left adrenal adenoma. SeeDr. Nicki pino. Pending appointment with endo. Continue with same meds for now  2. CKD stage G3a/A1, GFR 45-59 and albumin creatinine ratio <30 mg/g ([facility])  Assessment & Plan:  See nephrology   CKD related to HTN   Labs stable  Orders:  -     LIPID PANEL; Future  -     Hemoglobin A1C; Future  3. Hyperaldosteronism [facility])  Assessment & Plan:   He has history of accelerated hypertension requiring multiple medications to control his BP. Initial dx of HTN was about 4-5 years ago. In 2022, he had  renal arterial duplex that did not shows any evidence of renal artery stenosis. Then was found to have hyperaldo state. CT adrenal scan on 10/24/2022 showed evidence of left adrenal adenoma. SeeDr. Nicki pino. Pending appointment with endo. 4. Class 3 severe obesity due to excess calories with serious comorbidity and body mass index (BMI) of 50.0 to 59.9 in adult [facility])  Assessment & Plan:  Counseled patient  He walks everyday     Orders:  -     Hemoglobin A1C; Future    Return in about 4 months (around 2023) for Hypertension, Routine follow-up. Subjective   SUBJECTIVE/OBJECTIVE:  HPI  Here for 4 months follow-up for hypertension  Former patient of Dr. Eva Lazo  He has history of accelerated hypertension requiring multiple medications to control his BP. Initial dx of HTN was about 4-5 years ago.  In 2022, he had  renal arterial duplex that did not shows any evidence of renal artery stenosis. Then was found to have hyperaldo state. CT adrenal scan on 10/24/2022 showed evidence of left adrenal adenoma. Sees nephDr. Shauna moore. Pending appointment with endo. Home readings in the range of 140/70  Reports complaints with meds     Review of Systems   Constitutional:  Negative for activity change, appetite change, chills, diaphoresis, fatigue, fever and unexpected weight change. HENT:  Negative for trouble swallowing and voice change. Eyes:  Negative for photophobia and visual disturbance. Respiratory:  Negative for apnea, cough, choking, chest tightness, shortness of breath, wheezing and stridor. Cardiovascular:  Negative for chest pain, palpitations and leg swelling. Gastrointestinal:  Negative for abdominal distention, abdominal pain, constipation, diarrhea, nausea and vomiting. Genitourinary:  Negative for difficulty urinating and dysuria. Skin:  Negative for rash and wound. Neurological:  Negative for dizziness, weakness and light-headedness. Psychiatric/Behavioral:  Negative for agitation and behavioral problems. Current Outpatient Medications   Medication Sig Dispense Refill    amLODIPine (NORVASC) 10 MG tablet Take 1 tablet by mouth daily 30 tablet 0    spironolactone (ALDACTONE) 50 MG tablet Take 1 tablet by mouth daily 30 tablet 0    metoprolol succinate (TOPROL XL) 200 MG extended release tablet Take 1 tablet by mouth Daily with supper 30 tablet 0    terazosin (HYTRIN) 5 MG capsule Take 1 capsule by mouth nightly 30 capsule 0     No current facility-administered medications for this visit.        Objective     Vitals:    02/16/23 1119   BP: (!) 160/98   Pulse:    Temp:    SpO2:         Lab Results   Component Value Date    WBC 6.5 05/01/2022    HGB 16.7 05/01/2022    HCT 50.4 05/01/2022    MCV 81.7 05/01/2022     05/01/2022      Lab Results   Component Value Date     (L) 11/07/2022    K 4.9 11/07/2022    CL 96 (L) 11/07/2022    CO2 29 11/07/2022    BUN 18 11/07/2022    CREATININE 1.2 11/07/2022    GLUCOSE 85 11/07/2022    CALCIUM 10.2 11/07/2022    PROT 8.0 10/17/2021    LABALBU 4.4 09/28/2022    BILITOT 0.3 10/17/2021    ALKPHOS 57 10/17/2021    AST 20 10/17/2021    ALT 23 10/17/2021    LABGLOM >60 11/07/2022    GFRAA >60 09/28/2022    AGRATIO 0.8 (L) 10/17/2021    GLOB 4.4 10/17/2021     Lab Results   Component Value Date    CHOL 225 (H) 12/05/2017    CHOL 186 08/10/2016     Lab Results   Component Value Date    TRIG 163 (H) 12/05/2017    TRIG 96 08/10/2016     Lab Results   Component Value Date    HDL 45 12/05/2017    HDL 46 08/10/2016     Lab Results   Component Value Date    LDLCALC 147 (H) 12/05/2017    LDLCALC 121 (H) 08/10/2016     Lab Results   Component Value Date    LABVLDL 33 12/05/2017    LABVLDL 19 08/10/2016     No results found for: [facility]  Lab Results   Component Value Date    LABA1C 6.2 01/23/2019     Lab Results   Component Value Date    .2 01/23/2019           Physical Exam  Vitals and nursing note reviewed. Constitutional:       General: He is not in acute distress. Appearance: Normal appearance. He is well-developed. He is obese. He is not diaphoretic. HENT:      Head: Normocephalic and atraumatic. Eyes:      General: No scleral icterus. Conjunctiva/sclera: Conjunctivae normal.      Pupils: Pupils are equal, round, and reactive to light. Cardiovascular:      Rate and Rhythm: Normal rate and regular rhythm. Heart sounds: Normal heart sounds. No murmur heard. No friction rub. No gallop. Pulmonary:      Effort: Pulmonary effort is normal. No respiratory distress. Breath sounds: Normal breath sounds. No wheezing or rales. Chest:      Chest wall: No tenderness. Abdominal:      General: Bowel sounds are normal. There is no distension. Palpations: Abdomen is soft.    Musculoskeletal:         General: No tenderness or deformity. Normal range of motion. Cervical back: Normal range of motion and neck supple. Skin:     General: Skin is warm and dry. Neurological:      Mental Status: He is alert and oriented to person, place, and time. Cranial Nerves: No cranial nerve deficit. Sensory: No sensory deficit. Psychiatric:         Behavior: Behavior normal.            Please note that this chart was generated using dragon dictation software. Although every effort was made to ensure the accuracy of this automated transcription, some errors in transcription may have occurred.        --Va Avalos MD

## 2023-02-17 LAB
ESTIMATED AVERAGE GLUCOSE: 116.9 MG/DL
HBA1C MFR BLD: 5.7 %

## 2023-03-13 RX ORDER — AMLODIPINE BESYLATE 10 MG/1
10 TABLET ORAL DAILY
Qty: 90 TABLET | Refills: 1 | Status: SHIPPED | OUTPATIENT
Start: 2023-03-13

## 2023-03-13 RX ORDER — SPIRONOLACTONE 50 MG/1
50 TABLET, FILM COATED ORAL DAILY
Qty: 90 TABLET | Refills: 1 | Status: SHIPPED | OUTPATIENT
Start: 2023-03-13

## 2023-05-25 DIAGNOSIS — I15.2 HYPERTENSION DUE TO ENDOCRINE DISORDER: ICD-10-CM

## 2023-05-25 RX ORDER — TERAZOSIN 5 MG/1
CAPSULE ORAL
Qty: 90 CAPSULE | Refills: 1 | Status: SHIPPED | OUTPATIENT
Start: 2023-05-25

## 2023-06-23 DIAGNOSIS — I15.2 HYPERTENSION DUE TO ENDOCRINE DISORDER: ICD-10-CM

## 2023-06-26 DIAGNOSIS — I15.2 HYPERTENSION DUE TO ENDOCRINE DISORDER: ICD-10-CM

## 2023-06-26 RX ORDER — METOPROLOL SUCCINATE 200 MG/1
200 TABLET, EXTENDED RELEASE ORAL
Qty: 90 TABLET | Refills: 1 | Status: SHIPPED | OUTPATIENT
Start: 2023-06-26

## 2023-06-26 RX ORDER — METOPROLOL SUCCINATE 200 MG/1
200 TABLET, EXTENDED RELEASE ORAL
Qty: 90 TABLET | Refills: 1 | Status: CANCELLED | OUTPATIENT
Start: 2023-06-26

## 2023-06-27 RX ORDER — METOPROLOL SUCCINATE 200 MG/1
200 TABLET, EXTENDED RELEASE ORAL
Qty: 30 TABLET | Refills: 0 | OUTPATIENT
Start: 2023-06-27

## 2023-09-25 RX ORDER — SPIRONOLACTONE 50 MG/1
50 TABLET, FILM COATED ORAL DAILY
Qty: 90 TABLET | Refills: 1 | Status: SHIPPED | OUTPATIENT
Start: 2023-09-25

## 2023-09-25 RX ORDER — AMLODIPINE BESYLATE 10 MG/1
10 TABLET ORAL DAILY
Qty: 90 TABLET | Refills: 1 | Status: SHIPPED | OUTPATIENT
Start: 2023-09-25

## 2024-04-29 DIAGNOSIS — I15.2 HYPERTENSION DUE TO ENDOCRINE DISORDER: ICD-10-CM

## 2024-04-30 RX ORDER — AMLODIPINE BESYLATE 10 MG/1
10 TABLET ORAL DAILY
Qty: 90 TABLET | Refills: 1 | OUTPATIENT
Start: 2024-04-30

## 2024-04-30 RX ORDER — METOPROLOL SUCCINATE 200 MG/1
200 TABLET, EXTENDED RELEASE ORAL
Qty: 90 TABLET | Refills: 1 | OUTPATIENT
Start: 2024-04-30

## 2024-04-30 RX ORDER — TERAZOSIN 5 MG/1
CAPSULE ORAL
Qty: 90 CAPSULE | Refills: 1 | OUTPATIENT
Start: 2024-04-30

## 2024-04-30 RX ORDER — SPIRONOLACTONE 50 MG/1
50 TABLET, FILM COATED ORAL DAILY
Qty: 90 TABLET | Refills: 1 | OUTPATIENT
Start: 2024-04-30

## 2024-05-29 DIAGNOSIS — I15.2 HYPERTENSION DUE TO ENDOCRINE DISORDER: ICD-10-CM

## 2024-05-29 RX ORDER — TERAZOSIN 5 MG/1
5 CAPSULE ORAL NIGHTLY
Qty: 30 CAPSULE | Refills: 0 | Status: SHIPPED | OUTPATIENT
Start: 2024-05-29

## 2024-05-29 RX ORDER — METOPROLOL SUCCINATE 200 MG/1
200 TABLET, EXTENDED RELEASE ORAL
Qty: 30 TABLET | Refills: 0 | Status: SHIPPED | OUTPATIENT
Start: 2024-05-29

## 2024-05-29 RX ORDER — SPIRONOLACTONE 50 MG/1
50 TABLET, FILM COATED ORAL DAILY
Qty: 30 TABLET | Refills: 0 | Status: SHIPPED | OUTPATIENT
Start: 2024-05-29

## 2024-05-29 RX ORDER — AMLODIPINE BESYLATE 10 MG/1
10 TABLET ORAL DAILY
Qty: 30 TABLET | Refills: 0 | Status: SHIPPED | OUTPATIENT
Start: 2024-05-29

## 2024-05-29 NOTE — TELEPHONE ENCOUNTER
Pt needs refill on all meds; has appt scheduled for 6/11/24; requesting 30 day supply to get him through until the appt.     [id number] - [place], [place] - [facility] - P [phone] - F [phone] [[id number]]

## 2024-06-13 ENCOUNTER — OFFICE VISIT (OUTPATIENT)
Dept: INTERNAL MEDICINE CLINIC | Age: 39
End: 2024-06-13
Payer: COMMERCIAL

## 2024-06-13 VITALS
OXYGEN SATURATION: 97 % | WEIGHT: 315 LBS | HEIGHT: 69 IN | DIASTOLIC BLOOD PRESSURE: 84 MMHG | HEART RATE: 98 BPM | SYSTOLIC BLOOD PRESSURE: 146 MMHG | TEMPERATURE: 97.5 F | BODY MASS INDEX: 46.65 KG/M2

## 2024-06-13 DIAGNOSIS — D35.02 ADRENAL ADENOMA, LEFT: ICD-10-CM

## 2024-06-13 DIAGNOSIS — I15.2 HYPERTENSION DUE TO ENDOCRINE DISORDER: ICD-10-CM

## 2024-06-13 DIAGNOSIS — Z91.89 AT RISK FOR OBSTRUCTIVE SLEEP APNEA: ICD-10-CM

## 2024-06-13 DIAGNOSIS — N18.31 CKD STAGE G3A/A1, GFR 45-59 AND ALBUMIN CREATININE RATIO <30 MG/G (HCC): ICD-10-CM

## 2024-06-13 DIAGNOSIS — E26.9 HYPERALDOSTERONISM (HCC): Primary | ICD-10-CM

## 2024-06-13 DIAGNOSIS — E66.01 CLASS 3 SEVERE OBESITY DUE TO EXCESS CALORIES WITH SERIOUS COMORBIDITY AND BODY MASS INDEX (BMI) OF 50.0 TO 59.9 IN ADULT (HCC): ICD-10-CM

## 2024-06-13 PROCEDURE — 99214 OFFICE O/P EST MOD 30 MIN: CPT | Performed by: STUDENT IN AN ORGANIZED HEALTH CARE EDUCATION/TRAINING PROGRAM

## 2024-06-13 RX ORDER — METOPROLOL SUCCINATE 200 MG/1
200 TABLET, EXTENDED RELEASE ORAL
Qty: 90 TABLET | Refills: 1 | Status: SHIPPED | OUTPATIENT
Start: 2024-06-13

## 2024-06-13 RX ORDER — SPIRONOLACTONE 50 MG/1
50 TABLET, FILM COATED ORAL DAILY
Qty: 90 TABLET | Refills: 1 | Status: SHIPPED | OUTPATIENT
Start: 2024-06-13

## 2024-06-13 RX ORDER — TERAZOSIN 5 MG/1
5 CAPSULE ORAL NIGHTLY
Qty: 90 CAPSULE | Refills: 1 | Status: SHIPPED | OUTPATIENT
Start: 2024-06-13

## 2024-06-13 RX ORDER — AMLODIPINE BESYLATE 10 MG/1
10 TABLET ORAL DAILY
Qty: 90 TABLET | Refills: 1 | Status: SHIPPED | OUTPATIENT
Start: 2024-06-13

## 2024-06-13 SDOH — ECONOMIC STABILITY: FOOD INSECURITY: WITHIN THE PAST 12 MONTHS, THE FOOD YOU BOUGHT JUST DIDN'T LAST AND YOU DIDN'T HAVE MONEY TO GET MORE.: NEVER TRUE

## 2024-06-13 SDOH — ECONOMIC STABILITY: FOOD INSECURITY: WITHIN THE PAST 12 MONTHS, YOU WORRIED THAT YOUR FOOD WOULD RUN OUT BEFORE YOU GOT MONEY TO BUY MORE.: NEVER TRUE

## 2024-06-13 SDOH — ECONOMIC STABILITY: INCOME INSECURITY: HOW HARD IS IT FOR YOU TO PAY FOR THE VERY BASICS LIKE FOOD, HOUSING, MEDICAL CARE, AND HEATING?: NOT HARD AT ALL

## 2024-06-13 ASSESSMENT — PATIENT HEALTH QUESTIONNAIRE - PHQ9
1. LITTLE INTEREST OR PLEASURE IN DOING THINGS: NOT AT ALL
SUM OF ALL RESPONSES TO PHQ9 QUESTIONS 1 & 2: 0
2. FEELING DOWN, DEPRESSED OR HOPELESS: NOT AT ALL
SUM OF ALL RESPONSES TO PHQ QUESTIONS 1-9: 0

## 2024-06-13 NOTE — ASSESSMENT & PLAN NOTE
See nephrology   CKD related to HTN   Labs stable  Will need to follow up with them again. Information provided

## 2024-06-13 NOTE — PROGRESS NOTES
Manoj Clifton (:  1985) is a 39 y.o. male here for evaluation of the following chief complaint(s):  Medication Check      Assessment & Plan   ASSESSMENT/PLAN:  1. Hyperaldosteronism (HCC)  Assessment & Plan:  He has history of accelerated hypertension requiring multiple medications to control his BP.  Initial dx of HTN was about 4-5 years ago. In 2022, he had  renal arterial duplex that did not shows any evidence of renal artery stenosis. Then was found to have hyperaldo state. CT adrenal scan on 10/24/2022 showed evidence of left adrenal adenoma.   Sees Dr. Isabelle negro.  Pending appointment with endo. Did not yet make an appt. New referral placed,  Orders:  -     Too Bolaños MD, Endocrinology, [facility]  -     Hemoglobin A1C; Future  -     Lipid, Fasting; Future  -     TSH with Reflex; Future  -     Comprehensive Metabolic Panel; Future  -     CBC with Auto Differential; Future  2. CKD stage G3a/A1, GFR 45-59 and albumin creatinine ratio <30 mg/g (HCC)  Assessment & Plan:  See nephrology   CKD related to HTN   Labs stable  Will need to follow up with them again. Information provided   Orders:  -     Hemoglobin A1C; Future  -     Lipid, Fasting; Future  -     TSH with Reflex; Future  -     Comprehensive Metabolic Panel; Future  -     CBC with Auto Differential; Future  3. Hypertension due to endocrine disorder  Assessment & Plan:   He has history of accelerated hypertension requiring multiple medications to control his BP.  Initial dx of HTN was about 4-5 years ago. In 2022, he had  renal arterial duplex that did not shows any evidence of renal artery stenosis. Then was found to have hyperaldo state. CT adrenal scan on 10/24/2022 showed evidence of left adrenal adenoma.   SeeDr. Isabelle pino. Pending appointment with endo.   Continue with same meds for now.   Orders:  -     Hemoglobin A1C; Future  -     Lipid, Fasting; Future  -     TSH with Reflex; Future  -     Comprehensive

## 2024-06-13 NOTE — ASSESSMENT & PLAN NOTE
He has history of accelerated hypertension requiring multiple medications to control his BP.  Initial dx of HTN was about 4-5 years ago. In June 2022, he had  renal arterial duplex that did not shows any evidence of renal artery stenosis. Then was found to have hyperaldo state. CT adrenal scan on 10/24/2022 showed evidence of left adrenal adenoma.   Sees Dr. Isabelle negro.  Pending appointment with ky. Did not yet make an appt. New referral placed,

## 2024-06-13 NOTE — ASSESSMENT & PLAN NOTE
He has history of accelerated hypertension requiring multiple medications to control his BP.  Initial dx of HTN was about 4-5 years ago. In June 2022, he had  renal arterial duplex that did not shows any evidence of renal artery stenosis. Then was found to have hyperaldo state. CT adrenal scan on 10/24/2022 showed evidence of left adrenal adenoma.   Sees Dr. Isabelle negro. Pending appointment with endo.   Continue with same meds for now.

## 2025-01-20 DIAGNOSIS — I15.2 HYPERTENSION DUE TO ENDOCRINE DISORDER: ICD-10-CM

## 2025-01-20 RX ORDER — METOPROLOL SUCCINATE 200 MG/1
200 TABLET, EXTENDED RELEASE ORAL
Qty: 30 TABLET | Refills: 0 | Status: SHIPPED | OUTPATIENT
Start: 2025-01-20

## 2025-01-20 RX ORDER — SPIRONOLACTONE 50 MG/1
50 TABLET, FILM COATED ORAL DAILY
Qty: 90 TABLET | OUTPATIENT
Start: 2025-01-20

## 2025-01-20 RX ORDER — METOPROLOL SUCCINATE 200 MG/1
200 TABLET, EXTENDED RELEASE ORAL
Qty: 90 TABLET | OUTPATIENT
Start: 2025-01-20

## 2025-01-20 RX ORDER — SPIRONOLACTONE 50 MG/1
50 TABLET, FILM COATED ORAL DAILY
Qty: 30 TABLET | Refills: 0 | Status: SHIPPED | OUTPATIENT
Start: 2025-01-20

## 2025-01-20 RX ORDER — TERAZOSIN 5 MG/1
5 CAPSULE ORAL NIGHTLY
Qty: 90 CAPSULE | OUTPATIENT
Start: 2025-01-20

## 2025-01-20 RX ORDER — TERAZOSIN 5 MG/1
5 CAPSULE ORAL NIGHTLY
Qty: 30 CAPSULE | Refills: 0 | Status: SHIPPED | OUTPATIENT
Start: 2025-01-20

## 2025-01-20 RX ORDER — AMLODIPINE BESYLATE 10 MG/1
10 TABLET ORAL DAILY
Qty: 90 TABLET | OUTPATIENT
Start: 2025-01-20

## 2025-01-20 RX ORDER — AMLODIPINE BESYLATE 10 MG/1
10 TABLET ORAL DAILY
Qty: 30 TABLET | Refills: 0 | Status: SHIPPED | OUTPATIENT
Start: 2025-01-20

## 2025-01-20 NOTE — TELEPHONE ENCOUNTER
Last appointment: 6/13/2024  Next appointment: Visit date not found  30 days sent, with note that patient needs to call to schedule over due next office visit with provide       Last refill: 6/13/24

## 2025-03-13 DIAGNOSIS — I15.2 HYPERTENSION DUE TO ENDOCRINE DISORDER: ICD-10-CM

## 2025-03-15 RX ORDER — METOPROLOL SUCCINATE 200 MG/1
200 TABLET, EXTENDED RELEASE ORAL
Qty: 30 TABLET | Refills: 0 | Status: SHIPPED | OUTPATIENT
Start: 2025-03-15

## 2025-03-15 RX ORDER — TERAZOSIN 5 MG/1
5 CAPSULE ORAL NIGHTLY
Qty: 30 CAPSULE | Refills: 0 | Status: SHIPPED | OUTPATIENT
Start: 2025-03-15

## 2025-03-15 RX ORDER — AMLODIPINE BESYLATE 10 MG/1
10 TABLET ORAL DAILY
Qty: 30 TABLET | Refills: 0 | Status: SHIPPED | OUTPATIENT
Start: 2025-03-15

## 2025-03-15 RX ORDER — SPIRONOLACTONE 50 MG/1
50 TABLET, FILM COATED ORAL DAILY
Qty: 30 TABLET | Refills: 0 | Status: SHIPPED | OUTPATIENT
Start: 2025-03-15

## 2025-04-21 DIAGNOSIS — I15.2 HYPERTENSION DUE TO ENDOCRINE DISORDER: ICD-10-CM

## 2025-04-21 RX ORDER — SPIRONOLACTONE 50 MG/1
50 TABLET, FILM COATED ORAL DAILY
Qty: 30 TABLET | Refills: 0 | Status: SHIPPED | OUTPATIENT
Start: 2025-04-21

## 2025-04-21 RX ORDER — TERAZOSIN 5 MG/1
5 CAPSULE ORAL NIGHTLY
Qty: 30 CAPSULE | Refills: 0 | Status: SHIPPED | OUTPATIENT
Start: 2025-04-21

## 2025-04-21 RX ORDER — AMLODIPINE BESYLATE 10 MG/1
10 TABLET ORAL DAILY
Qty: 30 TABLET | Refills: 0 | Status: SHIPPED | OUTPATIENT
Start: 2025-04-21

## 2025-04-21 RX ORDER — METOPROLOL SUCCINATE 200 MG/1
200 TABLET, EXTENDED RELEASE ORAL
Qty: 30 TABLET | Refills: 0 | Status: SHIPPED | OUTPATIENT
Start: 2025-04-21

## 2025-04-21 NOTE — TELEPHONE ENCOUNTER
Last appointment: 6/13/2024  Next appointment: Visit date not found        Last refill: 3/15/25    30 day supply with 0 refills pended   Note to pharm pt needs appointment   Please review

## 2025-05-28 DIAGNOSIS — I15.2 HYPERTENSION DUE TO ENDOCRINE DISORDER: ICD-10-CM

## 2025-05-28 RX ORDER — METOPROLOL SUCCINATE 200 MG/1
200 TABLET, EXTENDED RELEASE ORAL
Qty: 90 TABLET | OUTPATIENT
Start: 2025-05-28

## 2025-05-28 RX ORDER — TERAZOSIN 5 MG/1
5 CAPSULE ORAL NIGHTLY
Qty: 90 CAPSULE | OUTPATIENT
Start: 2025-05-28

## 2025-05-28 RX ORDER — TERAZOSIN 5 MG/1
5 CAPSULE ORAL NIGHTLY
Qty: 30 CAPSULE | Refills: 0 | Status: SHIPPED | OUTPATIENT
Start: 2025-05-28

## 2025-05-28 RX ORDER — SPIRONOLACTONE 50 MG/1
50 TABLET, FILM COATED ORAL DAILY
Qty: 90 TABLET | OUTPATIENT
Start: 2025-05-28

## 2025-05-28 RX ORDER — AMLODIPINE BESYLATE 10 MG/1
10 TABLET ORAL DAILY
Qty: 30 TABLET | Refills: 0 | Status: SHIPPED | OUTPATIENT
Start: 2025-05-28

## 2025-05-28 RX ORDER — AMLODIPINE BESYLATE 10 MG/1
10 TABLET ORAL DAILY
Qty: 90 TABLET | OUTPATIENT
Start: 2025-05-28

## 2025-05-28 RX ORDER — SPIRONOLACTONE 50 MG/1
50 TABLET, FILM COATED ORAL DAILY
Qty: 30 TABLET | Refills: 0 | Status: SHIPPED | OUTPATIENT
Start: 2025-05-28

## 2025-05-28 RX ORDER — METOPROLOL SUCCINATE 200 MG/1
200 TABLET, EXTENDED RELEASE ORAL
Qty: 30 TABLET | Refills: 0 | Status: SHIPPED | OUTPATIENT
Start: 2025-05-28

## 2025-05-28 NOTE — TELEPHONE ENCOUNTER
Last appointment: 6/13/2024  Next appointment: Visit date not found        Last refill: 4/21/25    Return in about 4 months (around 10/13/2024) for Routine follow-up.    PATIENT OVER DUE FOR FOLLOW UP.   30 DAY SUPPLY 0 REFILLS   PENDED FOR REVIEW WITH NOTE TO PHARM PT NEEDS APT FOR FURTHER REFILLS.

## 2025-06-26 DIAGNOSIS — I15.2 HYPERTENSION DUE TO ENDOCRINE DISORDER: ICD-10-CM

## 2025-06-30 DIAGNOSIS — I15.2 HYPERTENSION DUE TO ENDOCRINE DISORDER: ICD-10-CM

## 2025-06-30 RX ORDER — SPIRONOLACTONE 50 MG/1
50 TABLET, FILM COATED ORAL DAILY
Qty: 90 TABLET | OUTPATIENT
Start: 2025-06-30

## 2025-06-30 RX ORDER — METOPROLOL SUCCINATE 200 MG/1
200 TABLET, EXTENDED RELEASE ORAL
Qty: 30 TABLET | Refills: 0 | Status: SHIPPED | OUTPATIENT
Start: 2025-06-30

## 2025-06-30 RX ORDER — METOPROLOL SUCCINATE 200 MG/1
200 TABLET, EXTENDED RELEASE ORAL
Qty: 30 TABLET | Refills: 0 | OUTPATIENT
Start: 2025-06-30

## 2025-06-30 RX ORDER — SPIRONOLACTONE 50 MG/1
50 TABLET, FILM COATED ORAL DAILY
Qty: 30 TABLET | Refills: 0 | Status: SHIPPED | OUTPATIENT
Start: 2025-06-30

## 2025-06-30 RX ORDER — SPIRONOLACTONE 50 MG/1
50 TABLET, FILM COATED ORAL DAILY
Qty: 30 TABLET | Refills: 0 | OUTPATIENT
Start: 2025-06-30

## 2025-06-30 RX ORDER — METOPROLOL SUCCINATE 200 MG/1
200 TABLET, EXTENDED RELEASE ORAL
Qty: 90 TABLET | OUTPATIENT
Start: 2025-06-30

## 2025-06-30 RX ORDER — AMLODIPINE BESYLATE 10 MG/1
10 TABLET ORAL DAILY
Qty: 30 TABLET | Refills: 0 | Status: SHIPPED | OUTPATIENT
Start: 2025-06-30

## 2025-06-30 RX ORDER — AMLODIPINE BESYLATE 10 MG/1
10 TABLET ORAL DAILY
Qty: 30 TABLET | Refills: 0 | OUTPATIENT
Start: 2025-06-30

## 2025-06-30 RX ORDER — AMLODIPINE BESYLATE 10 MG/1
10 TABLET ORAL DAILY
Qty: 90 TABLET | OUTPATIENT
Start: 2025-06-30

## 2025-06-30 NOTE — TELEPHONE ENCOUNTER
Pts pharmacy is requesting a refill on medication below for the pt:      spironolactone (ALDACTONE) 50 MG tablet [[id number]]     metoprolol succinate (TOPROL XL) 200 MG extended release tablet [[id number]]     amLODIPine (NORVASC) 10 MG tablet [[id number]]     [facility] #[id number] - [place] [address] - P [phone] - F [phone]

## 2025-06-30 NOTE — TELEPHONE ENCOUNTER
Last appointment: 6/13/2024  Next appointment: Visit date not found    30 day supply of each request pended with note    Patient can have 30 day supply of medication, but will need appt for further refills after today 6/30/25

## 2025-07-07 DIAGNOSIS — I15.2 HYPERTENSION DUE TO ENDOCRINE DISORDER: ICD-10-CM

## 2025-07-07 NOTE — TELEPHONE ENCOUNTER
Pt is requesting refills, has appointment scheduled for 7/28    amLODIPine (NORVASC) 10 MG tablet     metoprolol succinate (TOPROL XL) 200 MG extended release tablet     spironolactone (ALDACTONE) 50 MG tablet      terazosin (HYTRIN) 5 MG capsule     [facility] #[id number] [facility] [address] - P [phone] - F [phone]

## 2025-07-08 RX ORDER — SPIRONOLACTONE 50 MG/1
50 TABLET, FILM COATED ORAL DAILY
Qty: 30 TABLET | Refills: 0 | Status: SHIPPED | OUTPATIENT
Start: 2025-07-08

## 2025-07-08 RX ORDER — AMLODIPINE BESYLATE 10 MG/1
10 TABLET ORAL DAILY
Qty: 30 TABLET | Refills: 0 | Status: SHIPPED | OUTPATIENT
Start: 2025-07-08

## 2025-07-08 RX ORDER — TERAZOSIN 5 MG/1
5 CAPSULE ORAL NIGHTLY
Qty: 30 CAPSULE | Refills: 0 | Status: SHIPPED | OUTPATIENT
Start: 2025-07-08

## 2025-07-08 NOTE — TELEPHONE ENCOUNTER
Pt is past due for appointment. 30 day supply on amlodipine, metoprolol and spironolactone was sent on 6/30/25. Terazosin was sent on 5/28/25 he will run out before his appointment.

## 2025-07-08 NOTE — TELEPHONE ENCOUNTER
Pt has appt on 7-28-25 and needs refill below    [phone]  Pls call and advise      spironolactone (ALDACTONE) 50 MG tablet [[id number]]       amLODIPine (NORVASC) 10 MG tablet [[id number]]     [facility] #[id number] [place] [address] - P [phone] - F [phone]

## 2025-07-28 ENCOUNTER — OFFICE VISIT (OUTPATIENT)
Dept: INTERNAL MEDICINE CLINIC | Age: 40
End: 2025-07-28
Payer: COMMERCIAL

## 2025-07-28 VITALS
DIASTOLIC BLOOD PRESSURE: 82 MMHG | WEIGHT: 315 LBS | HEIGHT: 69 IN | SYSTOLIC BLOOD PRESSURE: 130 MMHG | BODY MASS INDEX: 46.65 KG/M2 | OXYGEN SATURATION: 97 % | TEMPERATURE: 97 F | HEART RATE: 93 BPM

## 2025-07-28 DIAGNOSIS — Z00.00 ENCOUNTER FOR WELL ADULT EXAM WITHOUT ABNORMAL FINDINGS: Primary | ICD-10-CM

## 2025-07-28 DIAGNOSIS — E26.9 HYPERALDOSTERONISM: ICD-10-CM

## 2025-07-28 DIAGNOSIS — I15.2 HYPERTENSION DUE TO ENDOCRINE DISORDER: ICD-10-CM

## 2025-07-28 DIAGNOSIS — Z91.89 AT RISK FOR OBSTRUCTIVE SLEEP APNEA: ICD-10-CM

## 2025-07-28 DIAGNOSIS — N18.31 CKD STAGE G3A/A1, GFR 45-59 AND ALBUMIN CREATININE RATIO <30 MG/G (HCC): ICD-10-CM

## 2025-07-28 DIAGNOSIS — E66.813 CLASS 3 SEVERE OBESITY IN ADULT (HCC): ICD-10-CM

## 2025-07-28 DIAGNOSIS — D35.02 ADRENAL ADENOMA, LEFT: ICD-10-CM

## 2025-07-28 PROCEDURE — 99396 PREV VISIT EST AGE 40-64: CPT | Performed by: STUDENT IN AN ORGANIZED HEALTH CARE EDUCATION/TRAINING PROGRAM

## 2025-07-28 SDOH — ECONOMIC STABILITY: FOOD INSECURITY: WITHIN THE PAST 12 MONTHS, YOU WORRIED THAT YOUR FOOD WOULD RUN OUT BEFORE YOU GOT MONEY TO BUY MORE.: NEVER TRUE

## 2025-07-28 SDOH — ECONOMIC STABILITY: FOOD INSECURITY: WITHIN THE PAST 12 MONTHS, THE FOOD YOU BOUGHT JUST DIDN'T LAST AND YOU DIDN'T HAVE MONEY TO GET MORE.: NEVER TRUE

## 2025-07-28 ASSESSMENT — PATIENT HEALTH QUESTIONNAIRE - PHQ9
8. MOVING OR SPEAKING SO SLOWLY THAT OTHER PEOPLE COULD HAVE NOTICED. OR THE OPPOSITE - BEING SO FIDGETY OR RESTLESS THAT YOU HAVE BEEN MOVING AROUND A LOT MORE THAN USUAL: NOT AT ALL
9. THOUGHTS THAT YOU WOULD BE BETTER OFF DEAD, OR OF HURTING YOURSELF: NOT AT ALL
6. FEELING BAD ABOUT YOURSELF - OR THAT YOU ARE A FAILURE OR HAVE LET YOURSELF OR YOUR FAMILY DOWN: NOT AT ALL
SUM OF ALL RESPONSES TO PHQ QUESTIONS 1-9: 7
4. FEELING TIRED OR HAVING LITTLE ENERGY: NOT AT ALL
2. FEELING DOWN, DEPRESSED OR HOPELESS: MORE THAN HALF THE DAYS
1. LITTLE INTEREST OR PLEASURE IN DOING THINGS: NEARLY EVERY DAY
10. IF YOU CHECKED OFF ANY PROBLEMS, HOW DIFFICULT HAVE THESE PROBLEMS MADE IT FOR YOU TO DO YOUR WORK, TAKE CARE OF THINGS AT HOME, OR GET ALONG WITH OTHER PEOPLE: NOT DIFFICULT AT ALL
SUM OF ALL RESPONSES TO PHQ QUESTIONS 1-9: 7
5. POOR APPETITE OR OVEREATING: NOT AT ALL
7. TROUBLE CONCENTRATING ON THINGS, SUCH AS READING THE NEWSPAPER OR WATCHING TELEVISION: NOT AT ALL
6. FEELING BAD ABOUT YOURSELF - OR THAT YOU ARE A FAILURE OR HAVE LET YOURSELF OR YOUR FAMILY DOWN: NOT AT ALL
4. FEELING TIRED OR HAVING LITTLE ENERGY: NOT AT ALL
3. TROUBLE FALLING OR STAYING ASLEEP: MORE THAN HALF THE DAYS
7. TROUBLE CONCENTRATING ON THINGS, SUCH AS READING THE NEWSPAPER OR WATCHING TELEVISION: NOT AT ALL
10. IF YOU CHECKED OFF ANY PROBLEMS, HOW DIFFICULT HAVE THESE PROBLEMS MADE IT FOR YOU TO DO YOUR WORK, TAKE CARE OF THINGS AT HOME, OR GET ALONG WITH OTHER PEOPLE: NOT DIFFICULT AT ALL
9. THOUGHTS THAT YOU WOULD BE BETTER OFF DEAD, OR OF HURTING YOURSELF: NOT AT ALL
SUM OF ALL RESPONSES TO PHQ QUESTIONS 1-9: 7
1. LITTLE INTEREST OR PLEASURE IN DOING THINGS: NEARLY EVERY DAY
2. FEELING DOWN, DEPRESSED OR HOPELESS: MORE THAN HALF THE DAYS
5. POOR APPETITE OR OVEREATING: NOT AT ALL
SUM OF ALL RESPONSES TO PHQ QUESTIONS 1-9: 7
SUM OF ALL RESPONSES TO PHQ QUESTIONS 1-9: 7
3. TROUBLE FALLING OR STAYING ASLEEP: MORE THAN HALF THE DAYS
8. MOVING OR SPEAKING SO SLOWLY THAT OTHER PEOPLE COULD HAVE NOTICED. OR THE OPPOSITE, BEING SO FIGETY OR RESTLESS THAT YOU HAVE BEEN MOVING AROUND A LOT MORE THAN USUAL: NOT AT ALL
SUM OF ALL RESPONSES TO PHQ9 QUESTIONS 1 & 2: 5

## 2025-07-28 ASSESSMENT — ENCOUNTER SYMPTOMS
APNEA: 0
SHORTNESS OF BREATH: 0
PHOTOPHOBIA: 0
CHOKING: 0
DIARRHEA: 0
CHEST TIGHTNESS: 0
ABDOMINAL PAIN: 0
VOICE CHANGE: 0
ABDOMINAL DISTENTION: 0
COUGH: 0
CONSTIPATION: 0
TROUBLE SWALLOWING: 0
WHEEZING: 0
VOMITING: 0
STRIDOR: 0
NAUSEA: 0

## 2025-07-28 NOTE — PROGRESS NOTES
Well Adult Note  Name: Manoj Clifton Today’s Date: 2025   MRN: [id number] Sex: Male   Age: 40 y.o. Ethnicity: Non- / Non    : 1985 Race: Black /       Manoj Clifton is here for a well adult exam.          Assessment & Plan    Encounter for well adult exam without abnormal findings  -     Hemoglobin A1C; Future  -     Lipid, Fasting; Future  -     TSH reflex to FT4; Future  -     Comprehensive Metabolic Panel; Future  -     CBC with Auto Differential; Future  CKD stage G3a/A1, GFR 45-59 and albumin creatinine ratio <30 mg/g (HCC)  Assessment & Plan:  See nephrology   CKD related to HTN   Labs stable  Will need to follow up with them again. Information provided   Orders:  -     Hemoglobin A1C; Future  -     Lipid, Fasting; Future  -     TSH reflex to FT4; Future  -     Comprehensive Metabolic Panel; Future  -     CBC with Auto Differential; Future  -     AFL (Epic) - Lobito Walton MD, Nephrology, [facility]     Too Bolaños MD, Endocrinology, [facility]  Hyperaldosteronism  Assessment & Plan:  He has history of accelerated hypertension requiring multiple medications to control his BP.  Initial dx of HTN was about 4-5 years ago. In 2022, he had  renal arterial duplex that did not shows any evidence of renal artery stenosis. Then was found to have hyperaldo state. CT adrenal scan on 10/24/2022 showed evidence of left adrenal adenoma.   Sees Dr. Isabelle negro.  Pending appointment with [facility]. Did not yet make an appt. New referral placed,  Orders:  -     Hemoglobin A1C; Future  -     Lipid, Fasting; Future  -     TSH reflex to FT4; Future  -     Comprehensive Metabolic Panel; Future  -     CBC with Auto Differential; Future  -     AFL (Epic) [facility], [facility]inology, [facility]  Hypertension due to endocrine disorder  Assessment & Plan:   He has history of accelerated

## 2025-07-28 NOTE — ASSESSMENT & PLAN NOTE
Counseled patient  He walks everyday  Not interested in weight management or medication at this time

## 2025-08-13 DIAGNOSIS — I15.2 HYPERTENSION DUE TO ENDOCRINE DISORDER: ICD-10-CM

## 2025-08-13 RX ORDER — METOPROLOL SUCCINATE 200 MG/1
200 TABLET, EXTENDED RELEASE ORAL
Qty: 90 TABLET | Refills: 1 | Status: SHIPPED | OUTPATIENT
Start: 2025-08-13